# Patient Record
Sex: FEMALE | Race: BLACK OR AFRICAN AMERICAN | NOT HISPANIC OR LATINO | Employment: FULL TIME | RURAL
[De-identification: names, ages, dates, MRNs, and addresses within clinical notes are randomized per-mention and may not be internally consistent; named-entity substitution may affect disease eponyms.]

---

## 2021-04-15 ENCOUNTER — HOSPITAL ENCOUNTER (EMERGENCY)
Facility: HOSPITAL | Age: 36
Discharge: HOME | End: 2021-04-15
Attending: STUDENT IN AN ORGANIZED HEALTH CARE EDUCATION/TRAINING PROGRAM
Payer: COMMERCIAL

## 2021-04-15 VITALS
HEIGHT: 60 IN | DIASTOLIC BLOOD PRESSURE: 95 MMHG | RESPIRATION RATE: 14 BRPM | WEIGHT: 255 LBS | SYSTOLIC BLOOD PRESSURE: 141 MMHG | BODY MASS INDEX: 50.06 KG/M2 | HEART RATE: 74 BPM | TEMPERATURE: 99 F | OXYGEN SATURATION: 100 %

## 2021-04-15 DIAGNOSIS — M94.0 COSTOCHONDRITIS: ICD-10-CM

## 2021-04-15 DIAGNOSIS — I10 HYPERTENSION, UNSPECIFIED TYPE: Primary | ICD-10-CM

## 2021-04-15 PROCEDURE — 99283 EMERGENCY DEPT VISIT LOW MDM: CPT | Mod: ,,, | Performed by: STUDENT IN AN ORGANIZED HEALTH CARE EDUCATION/TRAINING PROGRAM

## 2021-04-15 PROCEDURE — 63600175 PHARM REV CODE 636 W HCPCS: Performed by: STUDENT IN AN ORGANIZED HEALTH CARE EDUCATION/TRAINING PROGRAM

## 2021-04-15 PROCEDURE — 93010 ELECTROCARDIOGRAM REPORT: CPT | Mod: ,,, | Performed by: HOSPITALIST

## 2021-04-15 PROCEDURE — 99283 PR EMERGENCY DEPT VISIT,LEVEL III: ICD-10-PCS | Mod: ,,, | Performed by: STUDENT IN AN ORGANIZED HEALTH CARE EDUCATION/TRAINING PROGRAM

## 2021-04-15 PROCEDURE — 99283 EMERGENCY DEPT VISIT LOW MDM: CPT | Mod: 25

## 2021-04-15 PROCEDURE — 93010 EKG 12-LEAD: ICD-10-PCS | Mod: ,,, | Performed by: HOSPITALIST

## 2021-04-15 PROCEDURE — 93005 ELECTROCARDIOGRAM TRACING: CPT

## 2021-04-15 PROCEDURE — 96372 THER/PROPH/DIAG INJ SC/IM: CPT

## 2021-04-15 RX ORDER — KETOROLAC TROMETHAMINE 30 MG/ML
60 INJECTION, SOLUTION INTRAMUSCULAR; INTRAVENOUS
Status: COMPLETED | OUTPATIENT
Start: 2021-04-15 | End: 2021-04-15

## 2021-04-15 RX ORDER — METHOCARBAMOL 500 MG/1
1000 TABLET, FILM COATED ORAL
Qty: 30 TABLET | Refills: 0 | Status: SHIPPED | OUTPATIENT
Start: 2021-04-15 | End: 2021-04-20

## 2021-04-15 RX ORDER — ORPHENADRINE CITRATE 30 MG/ML
60 INJECTION INTRAMUSCULAR; INTRAVENOUS
Status: COMPLETED | OUTPATIENT
Start: 2021-04-15 | End: 2021-04-15

## 2021-04-15 RX ORDER — NAPROXEN 500 MG/1
500 TABLET ORAL 2 TIMES DAILY PRN
Qty: 14 TABLET | Refills: 0 | Status: SHIPPED | OUTPATIENT
Start: 2021-04-15

## 2021-04-15 RX ADMIN — KETOROLAC TROMETHAMINE 60 MG: 60 INJECTION, SOLUTION INTRAMUSCULAR at 04:04

## 2021-04-15 RX ADMIN — ORPHENADRINE CITRATE 60 MG: 60 INJECTION INTRAMUSCULAR; INTRAVENOUS at 04:04

## 2021-08-11 ENCOUNTER — OFFICE VISIT (OUTPATIENT)
Dept: FAMILY MEDICINE | Facility: CLINIC | Age: 36
End: 2021-08-11
Payer: COMMERCIAL

## 2021-08-11 DIAGNOSIS — Z11.52 ENCOUNTER FOR SCREENING FOR COVID-19: Primary | ICD-10-CM

## 2021-08-11 PROCEDURE — 87635 SARS-COV-2 (COVID-19) QUALITATIVE PCR: ICD-10-PCS | Mod: ,,, | Performed by: CLINICAL MEDICAL LABORATORY

## 2021-08-11 PROCEDURE — 87635 SARS-COV-2 COVID-19 AMP PRB: CPT | Mod: ,,, | Performed by: CLINICAL MEDICAL LABORATORY

## 2021-08-11 PROCEDURE — 99202 OFFICE O/P NEW SF 15 MIN: CPT | Mod: GC,,, | Performed by: FAMILY MEDICINE

## 2021-08-11 PROCEDURE — 99202 PR OFFICE/OUTPT VISIT, NEW, LEVL II, 15-29 MIN: ICD-10-PCS | Mod: GC,,, | Performed by: FAMILY MEDICINE

## 2021-08-12 LAB — SARS-COV-2 RNA RESP QL NAA+PROBE: NEGATIVE

## 2022-02-17 DIAGNOSIS — G56.01 CARPAL TUNNEL SYNDROME ON RIGHT: Primary | ICD-10-CM

## 2022-02-17 DIAGNOSIS — S66.126D LACERATION OF FLEXOR MUSCLE, FASCIA AND TENDON OF RIGHT LITTLE FINGER AT WRIST AND HAND LEVEL, SUBSEQUENT ENCOUNTER: ICD-10-CM

## 2022-02-22 NOTE — PROGRESS NOTES
[facility] Occupational Therapy  Initial Evaluation     Date: 2/23/2022  Name: Grisel Canales  Clinic Number: [id number]    Therapy Diagnosis: No diagnosis found.  Physician: Robin Jaeger MD    Physician Orders: Evaluate and treat.  Medical Diagnosis: Carpal tunnel syndrome on right [G56.01], Laceration of flexor muscle, fascia and tendon of right little finger at wrist and hand level, subsequent encounter [S66.855D]  Surgical Procedure and Date: 2/11/2022, / Date of Injury/Onset: 12/2021  Evaluation Date: 2/23/2022  Insurance Authorization Period Expiration: 2/17/2022 - 2/17/2023  Plan of Care Certification Period: 2/23/2022-4/13/2022      Visit # / Visits authorized: 1 / 13    FOTO: initial eval  Medicare Amount:     Time In:2:53  Time Out: 3:52  Total treatment time: 59minutes      Precautions:  Standard    Subjective     Involved Side: Right upper extremity  Dominant Side: Right  Date of Onset: December 2021  Mechanism of Injury: Pt. 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 Reports went to  Who gave her a shot thinking it was trigger finger, however did not get better. Reports she received MRI which indicated she had ruptured fdp of left 5th digit and frayed ring finger. Underwent surgery 2/11/2022 which consisted of LUE CTR and 5th digit fdp repair.  Surgical Procedure: Carpal tunnel syndrome on right [G56.01], Laceration of flexor muscle, fascia and tendon of right little finger at wrist and hand level  Imaging: MRI studies, CT scan films, bone scan films       Past Medical History/Physical Systems Review:   Grisel Canales  has no past medical history on file.    Grisel Canales  has no past surgical history on file.    Grisel has a current medication list which includes the following prescription(s): humira(cf) pen, albuterol, amlodipine, dicyclomine, famotidine, gabapentin, irbesartan-hydrochlorothiazide, ketorolac,  naproxen, oxybutynin, pantoprazole, potassium chloride, and topiramate.    Review of patient's allergies indicates:  No Known Allergies     Patient's Goals for Therapy: To be able to use Right hand again.    Pain:  Functional Pain Scale Rating 0-10:   4/10 on average  2/10 at best  10/10 at worst  Location: Right upper extremity  Description: Aching, Dull, Tight, Numb and Sharp  Aggravating Factors: Bending, Night Time, Morning, Extension and Flexing  Easing Factors: massage, relaxation, ice, rest and elevation    Occupation:    Working presently: employed      Functional Limitations/Social History:    Previous functional status includes: Independent with all ADLs.     Current FunctionalStatus   Home/Living environment : lives with their family      Limitation of Functional Status as follows:   ADLs/IADLs:     - Feeding: (I)    - Bathing: (I)    - Dressing/Grooming: (I)    - Driving: (I)            Objective     Observation/Appearance:     Edema. Measured in centimeters.   2/22/2022 2/22/2022    Right Left   Wrist Crease 17.4 15.5   MCPs 19.7 18.9     Edema. Measured in centimeters.   2/22/2022 2/22/2022    Right Left   Index:       P1      PIP     P2      DIP     P3     Long:       P1      PIP     P2      DIP     P3     Ring:       P1            6.4 5.8    PIP                P2             5,7 5.1    DIP     P3     Small:        P1           6 4.9     PIP            P2        5 4    DIP     P3     Thumb:     Prox. Phalanx     IP     Distal Phalanx       Elbow and Wrist ROM. Measured in degrees.   2/22/2022 2/22/2022    Right Left   Wrist Ext/Flex Neutral/45 67/65     Hand ROM. Measured in degrees.   2/22/2022 2/22/2022    Right Left        Index: MP  64 71              PIP     59 91              DIP 36 71              HYATT          Long:  MP 59 72              PIP 69 101              DIP 23 86              HYATT          Ring:   MP 37 75              PIP 61 101              DIP 15 84              HYATT           Small:  MP 30 89               PIP 44 88               DIP 20 82              HYATT          Thumb: MP 34 49                IP 60 84      Strength (Dynamometer) and Pinch Strength (Pinch Gauge)  Measured in pounds.   2/22/2022 2/22/2022    Right Left   Rung II NT 32   Key Pinch NT 15   3pt Pinch NT 8   2pt Pinch NT 10       Manual Muscle Test   2/22/2022 2/22/2022    Right Left   Wrist Extension  NT 4/5   Wrist Flexion NT 4/5         Special Tests  Thumb CMC Grind Test    Finkelstein's Test    Phalen's Test    Tinel's Test    Gigi's Test    Chromo-Littler Test    Digital Collateral Stress Test    ORL Test    Froment's Sign    Pinch OK Sign    Ernestina's Sign     Egawa Sign     Clamp Sign     SL Ballottement Test    LT Ballottement Test    Scaphoid/WatsonTest    Linscheid's Test    Metacarpal Stress Test    Piano Key Test    ECU Synergy Test    Ulnar Compression Test    TFCC Load Test    Ulnocarpal Stress Test    Midcarpal Shift Test    Pisiform Boost Test    Elbow Flexion Test    Scratch Collapse Test    Tennis Elbow Test    Resisted Middle Finger Extension Test    Mills Test    Chair Test    Biceps Squeeze Test    Biceps Hook Test    Milking Test    Press Up Manuever    PLRI Test    Valgus Stress Test    Varus Stress Test    Spurling Test    Cervical Distraction Test    ULNTT - General    ULNTT - Median Nerve    ULNTT - Radial Nerve    ULNTT - Ulnar Nerve         CMS Impairment/Limitation/Restriction for FOTO  Survey    Therapist reviewed FOTO scores for Grisel Canales on 2/23/2022.   FOTO documents entered into [employer] - see Media section.    Limitation Score: 25%         Treatment     Treatment Time In: 2:53  Treatment Time Out: 3:52    Grisel received the following supervised modalities after being cleared for contradictions for  minutes:   --N/A    Grisel received the following manual therapy techniques for 8 minutes:   -RUE PROM- Tenodesis- 2x5, Tendon glides- 2x5, 4th/5th digit- MCP flx/ext- 2x5, PIP flx/ext-  2x5, DIP flx/ext-2x5    Grisel received therapeutic exercises for  minutes including:  --N/A    Home Exercise Program/Education:  Issued HEP (see patient instructions in EMR) and educated on modality use for pain management . Exercises were reviewed and Grisel was able to demonstrate them prior to the end of the session.   Pt received a written copy of exercises to perform at home. Grisel demonstrated good  understanding of the education provided.  Pt was advised to perform these exercises free of pain, and to stop performing them if pain occurs.    Patient/Family Education: role of OT, goals for OT, scheduling/cancellations - pt verbalized understanding. Discussed insurance limitations with patient.    Additional Education provided:     Assessment     Grisel Canales is a 36 y.o. female referred to outpatient occupational therapy and presents with a medical diagnosis of Carpal tunnel syndrome on right [G56.01], Laceration of flexor muscle, fascia and tendon of right little finger at wrist and hand level, subsequent encounter [S66.126D]  , resulting in decreased ROM/strength with increased edema/pain and episodes of numbness/tingling and demonstrates limitations as described above. Following medical record review it is determined that pt will benefit from occupational therapy services in order to maximize pain free and/or functional use of right upper extremity. The following goals were discussed with the patient and patient is in agreement with them as to be addressed in the treatment plan. The patient's rehab potential is Good.     Anticipated barriers to occupational therapy:   Pt has no cultural, educational or language barriers to learning provided.        Goals:   The following goals were discussed with the patient and patient is in agreement with them as to be addressed in the treatment plan.   Short term Goals:  1) Initiate HEP  2) Pt will increase AROM of RUE by 5-10 degrees in order to assist with functional  full fist by 4 weeks.  3) Pt will reduce edema by .5-1 cm in affected fingers by 4 weeks.  4) Pt will reduce pain to less than 4/10 by 4 weeks.  5) Pt will increase functional  strength by 5# in order to A in opening containers for med management or home management tasks by 4 weeks.   6) Patient will be able to achieve less than or equal to 25% on the FOTO, demonstrating overall improved functional ability with upper extremity. (Self-care category)    Long Term Goals:  Goals to be met by discharge:  1) Independent with HEP  2) Pt will increase RUE HYATT by 30-40 degrees in order to increase functional fist for grasp with home management or work related tasks by d/c.   3) Pt will decrease edema to trace or none to increase functional ROM by d/c.   4) Pt will decrease pain to trace or none while completing light home management tasks or work related tasks by d/c.   5) Patient will be able to achieve less than or equal to 80% on the FOTO, demonstrating overall improved functional ability with upper extremity.  (Self-care category)        Plan   Certification Period/Plan of care expiration: 2/23/2022 to 4/13/2022.    Outpatient Occupational Therapy 2 times weekly for 7 weeks to include the following interventions: Paraffin, Fluidotherapy, Manual therapy/joint mobilizations, Modalities for pain management, US 3 mhz, Therapeutic exercises/activities., Iontophoresis with 2.0 cc Dexamethasone, Strengthening, Orthotic Fabrication/Fit/Training, Edema Control, Scar Management, Electrical Modalities, Joint Protection and Energy Conservation.      YAA MATTHEWS, OT

## 2022-02-23 ENCOUNTER — CLINICAL SUPPORT (OUTPATIENT)
Dept: REHABILITATION | Facility: HOSPITAL | Age: 37
End: 2022-02-23
Payer: COMMERCIAL

## 2022-02-23 DIAGNOSIS — S66.126D LACERATION OF FLEXOR MUSCLE, FASCIA AND TENDON OF RIGHT LITTLE FINGER AT WRIST AND HAND LEVEL, SUBSEQUENT ENCOUNTER: ICD-10-CM

## 2022-02-23 DIAGNOSIS — G56.01 CARPAL TUNNEL SYNDROME ON RIGHT: ICD-10-CM

## 2022-02-23 PROCEDURE — 97166 OT EVAL MOD COMPLEX 45 MIN: CPT

## 2022-02-23 PROCEDURE — 97140 MANUAL THERAPY 1/> REGIONS: CPT

## 2022-02-23 NOTE — PLAN OF CARE
[facility] Occupational Therapy  Initial Evaluation     Date: 2/23/2022  Name: Grisel Canales  Clinic Number: [id number]    Therapy Diagnosis: No diagnosis found.  Physician: Robin Jaeger MD    Physician Orders: Evaluate and treat.  Medical Diagnosis: Carpal tunnel syndrome on right [G56.01], Laceration of flexor muscle, fascia and tendon of right little finger at wrist and hand level, subsequent encounter [S66.026D]  Surgical Procedure and Date: 2/11/2022, / Date of Injury/Onset: 12/2021  Evaluation Date: 2/23/2022  Insurance Authorization Period Expiration: 2/17/2022 - 2/17/2023  Plan of Care Certification Period: 2/23/2022-4/13/2022      Visit # / Visits authorized: 1 / 13    FOTO: initial eval  Medicare Amount:     Time In:2:53  Time Out: 3:52  Total treatment time: 59minutes      Precautions:  Standard    Subjective     Involved Side: Right upper extremity  Dominant Side: Right  Date of Onset: December 2021  Mechanism of Injury: Pt. 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 Reports went to  Who gave her a shot thinking it was trigger finger, however did not get better. Reports she received MRI which indicated she had ruptured fdp of left 5th digit and frayed ring finger. Underwent surgery 2/11/2022 which consisted of LUE CTR and 5th digit fdp repair.  Surgical Procedure: Carpal tunnel syndrome on right [G56.01], Laceration of flexor muscle, fascia and tendon of right little finger at wrist and hand level  Imaging: MRI studies, CT scan films, bone scan films       Past Medical History/Physical Systems Review:   Grisel Canales  has no past medical history on file.    Grisel Canales  has no past surgical history on file.    Grisel has a current medication list which includes the following prescription(s): humira(cf) pen, albuterol, amlodipine, dicyclomine, famotidine, gabapentin, irbesartan-hydrochlorothiazide, ketorolac,  naproxen, oxybutynin, pantoprazole, potassium chloride, and topiramate.    Review of patient's allergies indicates:  No Known Allergies     Patient's Goals for Therapy: To be able to use Right hand again.    Pain:  Functional Pain Scale Rating 0-10:   4/10 on average  2/10 at best  10/10 at worst  Location: Right upper extremity  Description: Aching, Dull, Tight, Numb and Sharp  Aggravating Factors: Bending, Night Time, Morning, Extension and Flexing  Easing Factors: massage, relaxation, ice, rest and elevation    Occupation:    Working presently: employed      Functional Limitations/Social History:    Previous functional status includes: Independent with all ADLs.     Current FunctionalStatus   Home/Living environment : lives with their family      Limitation of Functional Status as follows:   ADLs/IADLs:     - Feeding: (I)    - Bathing: (I)    - Dressing/Grooming: (I)    - Driving: (I)            Objective     Observation/Appearance:     Edema. Measured in centimeters.   2/22/2022 2/22/2022    Right Left   Wrist Crease 17.4 15.5   MCPs 19.7 18.9     Edema. Measured in centimeters.   2/22/2022 2/22/2022    Right Left   Index:       P1      PIP     P2      DIP     P3     Long:       P1      PIP     P2      DIP     P3     Ring:       P1            6.4 5.8    PIP                P2             5,7 5.1    DIP     P3     Small:        P1           6 4.9     PIP            P2        5 4    DIP     P3     Thumb:     Prox. Phalanx     IP     Distal Phalanx       Elbow and Wrist ROM. Measured in degrees.   2/22/2022 2/22/2022    Right Left   Wrist Ext/Flex Neutral/45 67/65     Hand ROM. Measured in degrees.   2/22/2022 2/22/2022    Right Left        Index: MP  64 71              PIP     59 91              DIP 36 71              HYATT          Long:  MP 59 72              PIP 69 101              DIP 23 86              HYATT          Ring:   MP 37 75              PIP 61 101              DIP 15 84              HYATT           Small:  MP 30 89               PIP 44 88               DIP 20 82              HYATT          Thumb: MP 34 49                IP 60 84      Strength (Dynamometer) and Pinch Strength (Pinch Gauge)  Measured in pounds.   2/22/2022 2/22/2022    Right Left   Rung II NT 32   Key Pinch NT 15   3pt Pinch NT 8   2pt Pinch NT 10       Manual Muscle Test   2/22/2022 2/22/2022    Right Left   Wrist Extension  NT 4/5   Wrist Flexion NT 4/5         Special Tests  Thumb CMC Grind Test    Finkelstein's Test    Phalen's Test    Tinel's Test    Gigi's Test    [place]-Littler Test    Digital Collateral Stress Test    ORL Test    Froment's Sign    Pinch OK Sign    Ernestina's Sign     Egawa Sign     Clamp Sign     SL Ballottement Test    LT Ballottement Test    Scaphoid/WatsonTest    Linscheid's Test    Metacarpal Stress Test    Piano Key Test    ECU Synergy Test    Ulnar Compression Test    TFCC Load Test    Ulnocarpal Stress Test    Midcarpal Shift Test    Pisiform Boost Test    Elbow Flexion Test    Scratch Collapse Test    Tennis Elbow Test    Resisted Middle Finger Extension Test    Mills Test    Chair Test    Biceps Squeeze Test    Biceps Hook Test    Milking Test    Press Up Manuever    PLRI Test    Valgus Stress Test    Varus Stress Test    Spurling Test    Cervical Distraction Test    ULNTT - General    ULNTT - Median Nerve    ULNTT - Radial Nerve    ULNTT - Ulnar Nerve         CMS Impairment/Limitation/Restriction for FOTO  Survey    Therapist reviewed FOTO scores for Grisel Canales on 2/23/2022.   FOTO documents entered into [employer] - see Media section.    Limitation Score: 25%         Treatment     Treatment Time In: 2:53  Treatment Time Out: 3:52    Grisel received the following supervised modalities after being cleared for contradictions for  minutes:   --N/A    Grisel received the following manual therapy techniques for 8 minutes:   -RUE PROM- Tenodesis- 2x5, Tendon glides- 2x5, 4th/5th digit- MCP flx/ext- 2x5, PIP flx/ext-  2x5, DIP flx/ext-2x5    Grisel received therapeutic exercises for  minutes including:  --N/A    Home Exercise Program/Education:  Issued HEP (see patient instructions in EMR) and educated on modality use for pain management . Exercises were reviewed and Grisel was able to demonstrate them prior to the end of the session.   Pt received a written copy of exercises to perform at home. Grisel demonstrated good  understanding of the education provided.  Pt was advised to perform these exercises free of pain, and to stop performing them if pain occurs.    Patient/Family Education: role of OT, goals for OT, scheduling/cancellations - pt verbalized understanding. Discussed insurance limitations with patient.    Additional Education provided:     Assessment     Grisel Canales is a 36 y.o. female referred to outpatient occupational therapy and presents with a medical diagnosis of Carpal tunnel syndrome on right [G56.01], Laceration of flexor muscle, fascia and tendon of right little finger at wrist and hand level, subsequent encounter [S66.126D]  , resulting in decreased ROM/strength with increased edema/pain and episodes of numbness/tingling and demonstrates limitations as described above. Following medical record review it is determined that pt will benefit from occupational therapy services in order to maximize pain free and/or functional use of right upper extremity. The following goals were discussed with the patient and patient is in agreement with them as to be addressed in the treatment plan. The patient's rehab potential is Good.     Anticipated barriers to occupational therapy:   Pt has no cultural, educational or language barriers to learning provided.        Goals:   The following goals were discussed with the patient and patient is in agreement with them as to be addressed in the treatment plan.   Short term Goals:  1) Initiate HEP  2) Pt will increase AROM of RUE by 5-10 degrees in order to assist with functional  full fist by 4 weeks.  3) Pt will reduce edema by .5-1 cm in affected fingers by 4 weeks.  4) Pt will reduce pain to less than 4/10 by 4 weeks.  5) Pt will increase functional  strength by 5# in order to A in opening containers for med management or home management tasks by 4 weeks.   6) Patient will be able to achieve less than or equal to 25% on the FOTO, demonstrating overall improved functional ability with upper extremity. (Self-care category)    Long Term Goals:  Goals to be met by discharge:  1) Independent with HEP  2) Pt will increase RUE HYATT by 30-40 degrees in order to increase functional fist for grasp with home management or work related tasks by d/c.   3) Pt will decrease edema to trace or none to increase functional ROM by d/c.   4) Pt will decrease pain to trace or none while completing light home management tasks or work related tasks by d/c.   5) Patient will be able to achieve less than or equal to 80% on the FOTO, demonstrating overall improved functional ability with upper extremity.  (Self-care category)        Plan   Certification Period/Plan of care expiration: 2/23/2022 to 4/13/2022.    Outpatient Occupational Therapy 2 times weekly for 7 weeks to include the following interventions: Paraffin, Fluidotherapy, Manual therapy/joint mobilizations, Modalities for pain management, US 3 mhz, Therapeutic exercises/activities., Iontophoresis with 2.0 cc Dexamethasone, Strengthening, Orthotic Fabrication/Fit/Training, Edema Control, Scar Management, Electrical Modalities, Joint Protection and Energy Conservation.      YAA MATTHEWS, OT

## 2022-02-28 NOTE — PROGRESS NOTES
[facility]  Occupational Therapy Treatment Note    Date: 3/2/2022  Name: Grisel Canales  Clinic Number: [id number]    Therapy Diagnosis: No diagnosis found.  Physician: Robin Jaeger MD    Physician Orders: Evaluate and treat.  Medical Diagnosis: Carpal tunnel syndrome on right [G56.01], Laceration of flexor muscle, fascia and tendon of right little finger at wrist and hand level, subsequent encounter [S66.138D]  Surgical Procedure and Date: 2/11/2022,   Evaluation Date: 2/23/2022  Insurance Authorization Period Expiration: 2/17/2022 - 2/17/2023  Plan of Care Expiration: 2/23/2022-4/13/2022    Visit # / Visits authorized: 2 / 13      Precautions:  Standard    Time In: 8:56  Time Out: 9:56  Total Billable Time: 60 minutes    SUBJECTIVE     Pt reports: she has more swelling today.  She was compliant with home exercise program given last session.   Response to previous treatment:  Functional change:     Pain: 3/10  Location: right fingers , hands  and wrists      OBJECTIVE     Objective Measures updated at progress report unless specified.    Treatment     Grisel received the treatments listed below:     Supervised modalities after being cleared for contradictions: Paraffin bath - N/A    Direct contact modalities after being cleared for contraindications: Ultrasound - 8min. Volar palm 3.0 MHZ, cont. .70w/cm2    Manual therapy techniques: Joint mobilizations, Myofacial release, Soft tissue Mobilization and Friction Massage were applied to the: Right upper extremity for 32 minutes, including:  -RUE PROM-Wrist flx/ext- 2x10  , joint mobz/distraction- 1x5 , Tendon glides- 5x5 , digits 4/5   MCP flx/ext-  2x10, PIP flx/ext-2x10  , DIP flx/ext-  2x10,soft tissue massage- 25min.    Therapeutic exercises to develop strength, endurance, ROM and flexibility for 20 minutes, including:  -RUE AROM- wrist flx/ext-1x10, Place/hold exercises- 1x15, blocking exercises DIP joint 5th- 2x10, pip- 2x10, mcp  2x10        Patient Education and Home Exercises      Education provided:   -   - Progress towards goals     Written Home Exercises Provided: Patient instructed to cont prior HEP.  Exercises were reviewed and Grisel was able to demonstrate them prior to the end of the session.  Grisel demonstrated good  understanding of the HEP provided. See EMR under Patient Instructions for exercises provided during therapy sessions.       Assessment     Pt would continue to benefit from skilled OT.      Grisel is progressing well towards her goals and there are no updates to goals at this time. Pt prognosis is Good.     Pt will continue to benefit from skilled outpatient occupational therapy to address the deficits listed in the problem list on initial evaluation provide pt/family education and to maximize pt's level of independence in the home and community environment.     Pt's spiritual, cultural and educational needs considered and pt agreeable to plan of care and goals.    Anticipated barriers to occupational therapy:     Goals:  Pt. Will increase AROM of Right Upper extremity as measured by goniometric measurements  Pt. Will increase Right /pinch strength as measured by dynamometer/pinch gauge.  Pt. Will demonstrate ability to utilize RUE to perform functional tasks (I).  Pt. Will be (I) with HEP.    PLAN     Continue OT POC.      YAA MATTHEWS, OT

## 2022-03-02 ENCOUNTER — CLINICAL SUPPORT (OUTPATIENT)
Dept: REHABILITATION | Facility: HOSPITAL | Age: 37
End: 2022-03-02
Payer: COMMERCIAL

## 2022-03-02 DIAGNOSIS — S66.126D LACERATION OF FLEXOR MUSCLE, FASCIA AND TENDON OF RIGHT LITTLE FINGER AT WRIST AND HAND LEVEL, SUBSEQUENT ENCOUNTER: Primary | ICD-10-CM

## 2022-03-02 PROCEDURE — 97140 MANUAL THERAPY 1/> REGIONS: CPT

## 2022-03-02 PROCEDURE — 97035 APP MDLTY 1+ULTRASOUND EA 15: CPT

## 2022-03-02 PROCEDURE — 97110 THERAPEUTIC EXERCISES: CPT

## 2022-03-03 ENCOUNTER — CLINICAL SUPPORT (OUTPATIENT)
Dept: REHABILITATION | Facility: HOSPITAL | Age: 37
End: 2022-03-03
Payer: COMMERCIAL

## 2022-03-03 DIAGNOSIS — S66.126D LACERATION OF FLEXOR MUSCLE, FASCIA AND TENDON OF RIGHT LITTLE FINGER AT WRIST AND HAND LEVEL, SUBSEQUENT ENCOUNTER: Primary | ICD-10-CM

## 2022-03-03 PROCEDURE — 97110 THERAPEUTIC EXERCISES: CPT

## 2022-03-03 PROCEDURE — 97035 APP MDLTY 1+ULTRASOUND EA 15: CPT

## 2022-03-03 PROCEDURE — 97140 MANUAL THERAPY 1/> REGIONS: CPT

## 2022-03-03 NOTE — PROGRESS NOTES
[facility]  Occupational Therapy Treatment Note    Date: 3/3/2022  Name: Grisel Canales  Clinic Number: [id number]    Therapy Diagnosis: No diagnosis found.  Physician: Robin Jaeger MD    Physician Orders: Evaluate and treat.  Medical Diagnosis: Carpal tunnel syndrome on right [G56.01], Laceration of flexor muscle, fascia and tendon of right little finger at wrist and hand level, subsequent encounter [S66.892D]  Surgical Procedure and Date: 2/11/2022,   Evaluation Date: 2/23/2022  Insurance Authorization Period Expiration: 2/17/2022 - 2/17/2023  Plan of Care Expiration: 2/23/2022-4/13/2022    Visit # / Visits authorized: 3 / 13      Precautions:  Standard    Time In: 2:02  Time Out: 2:56  Total Billable Time: 54 minutes    SUBJECTIVE     Pt reports: she has more swelling today.  She was compliant with home exercise program given last session.   Response to previous treatment:  Functional change:     Pain: 3/10  Location: right fingers , hands  and wrists      OBJECTIVE     Objective Measures updated at progress report unless specified.    Treatment     Grisel received the treatments listed below:     Supervised modalities after being cleared for contradictions: Paraffin bath - N/A    Direct contact modalities after being cleared for contraindications: Ultrasound - 8min. Volar palm 3.0 MHZ, cont. .70w/cm2    Manual therapy techniques: Joint mobilizations, Myofacial release, Soft tissue Mobilization and Friction Massage were applied to the: Right upper extremity for 26 minutes, including:  -RUE PROM-Wrist flx/ext- 2x10  , joint mobz/distraction- 1x5 , Tendon glides- 5x5 , digits 4/5   MCP flx/ext-  2x10, PIP flx/ext-2x10  , DIP flx/ext-  2x10,soft tissue massage- 25min.    Therapeutic exercises to develop strength, endurance, ROM and flexibility for 20 minutes, including:  -RUE AROM- wrist flx/ext-1x10, Place/hold exercises- 1x15, blocking exercises DIP joint 5th- 2x10, pip- 2x10, mcp  2x10        Patient Education and Home Exercises      Education provided:   -   - Progress towards goals     Written Home Exercises Provided: Patient instructed to cont prior HEP.  Exercises were reviewed and Grisel was able to demonstrate them prior to the end of the session.  Grisel demonstrated good  understanding of the HEP provided. See EMR under Patient Instructions for exercises provided during therapy sessions.       Assessment     Pt would continue to benefit from skilled OT.      Grisel is progressing well towards her goals and there are no updates to goals at this time. Pt prognosis is Good.     Pt will continue to benefit from skilled outpatient occupational therapy to address the deficits listed in the problem list on initial evaluation provide pt/family education and to maximize pt's level of independence in the home and community environment.     Pt's spiritual, cultural and educational needs considered and pt agreeable to plan of care and goals.    Anticipated barriers to occupational therapy:     Goals:  Pt. Will increase AROM of Right Upper extremity as measured by goniometric measurements  Pt. Will increase Right /pinch strength as measured by dynamometer/pinch gauge.  Pt. Will demonstrate ability to utilize RUE to perform functional tasks (I).  Pt. Will be (I) with HEP.    PLAN     Continue OT POC.      YAA MATTHEWS, OT

## 2022-03-08 ENCOUNTER — CLINICAL SUPPORT (OUTPATIENT)
Dept: REHABILITATION | Facility: HOSPITAL | Age: 37
End: 2022-03-08
Payer: COMMERCIAL

## 2022-03-08 DIAGNOSIS — S66.126D LACERATION OF FLEXOR MUSCLE, FASCIA AND TENDON OF RIGHT LITTLE FINGER AT WRIST AND HAND LEVEL, SUBSEQUENT ENCOUNTER: Primary | ICD-10-CM

## 2022-03-08 PROCEDURE — 97035 APP MDLTY 1+ULTRASOUND EA 15: CPT

## 2022-03-08 PROCEDURE — 97110 THERAPEUTIC EXERCISES: CPT

## 2022-03-08 PROCEDURE — 97140 MANUAL THERAPY 1/> REGIONS: CPT

## 2022-03-08 NOTE — PROGRESS NOTES
[facility]  Occupational Therapy Treatment Note    Date: 3/8/2022  Name: Grisel Canales  Clinic Number: [id number]    Therapy Diagnosis: No diagnosis found.  Physician: Robin Jaeger MD    Physician Orders: Evaluate and treat.  Medical Diagnosis: Carpal tunnel syndrome on right [G56.01], Laceration of flexor muscle, fascia and tendon of right little finger at wrist and hand level, subsequent encounter [S66.055D]  Surgical Procedure and Date: 2/11/2022,   Evaluation Date: 2/23/2022  Insurance Authorization Period Expiration: 2/17/2022 - 2/17/2023  Plan of Care Expiration: 2/23/2022-4/13/2022    Visit # / Visits authorized: 4/ 13      Precautions:  Standard    Time In: 8:13  Time Out: 9:10  Total Billable Time: 57 minutes    SUBJECTIVE     Pt reports: she is sore in right 5th dip joint this date.  She was compliant with home exercise program given last session.   Response to previous treatment:  Functional change:     Pain: 3/10  Location: right fingers , hands  and wrists      OBJECTIVE     Objective Measures updated at progress report unless specified.    Treatment     Grisel received the treatments listed below:     Supervised modalities after being cleared for contradictions: Paraffin bath - N/A    Direct contact modalities after being cleared for contraindications: Ultrasound - 8min. Volar palm 3.0 MHZ, cont. .70w/cm2    Manual therapy techniques: Joint mobilizations, Myofacial release, Soft tissue Mobilization and Friction Massage were applied to the: Right upper extremity for 29 minutes, including:  -RUE PROM-Wrist flx/ext- 2x10  , joint mobz/distraction- 1x5 , Tendon glides- 5x5 , digits 4/5   MCP flx/ext-  2x10, PIP flx/ext-2x10  , DIP flx/ext-  2x10,soft tissue massage- 25min.    Therapeutic exercises to develop strength, endurance, ROM and flexibility for 20 minutes, including:  -RUE AROM- wrist flx/ext-1x10, Place/hold exercises- 1x15, blocking exercises DIP joint 5th- 2x10, pip-  2x10, mcp 2x10        Patient Education and Home Exercises      Education provided:   -   - Progress towards goals     Written Home Exercises Provided: Patient instructed to cont prior HEP.  Exercises were reviewed and Grisel was able to demonstrate them prior to the end of the session.  Grisel demonstrated good  understanding of the HEP provided. See EMR under Patient Instructions for exercises provided during therapy sessions.       Assessment     Pt would continue to benefit from skilled OT.      Grisel is progressing well towards her goals and there are no updates to goals at this time. Pt prognosis is Good.     Pt will continue to benefit from skilled outpatient occupational therapy to address the deficits listed in the problem list on initial evaluation provide pt/family education and to maximize pt's level of independence in the home and community environment.     Pt's spiritual, cultural and educational needs considered and pt agreeable to plan of care and goals.    Anticipated barriers to occupational therapy:     Goals:  Pt. Will increase AROM of Right Upper extremity as measured by goniometric measurements  Pt. Will increase Right /pinch strength as measured by dynamometer/pinch gauge.  Pt. Will demonstrate ability to utilize RUE to perform functional tasks (I).  Pt. Will be (I) with HEP.    PLAN     Continue OT POC.      YAA MATTHEWS, OT

## 2022-03-10 ENCOUNTER — CLINICAL SUPPORT (OUTPATIENT)
Dept: REHABILITATION | Facility: HOSPITAL | Age: 37
End: 2022-03-10
Payer: COMMERCIAL

## 2022-03-10 DIAGNOSIS — S66.126D LACERATION OF FLEXOR MUSCLE, FASCIA AND TENDON OF RIGHT LITTLE FINGER AT WRIST AND HAND LEVEL, SUBSEQUENT ENCOUNTER: Primary | ICD-10-CM

## 2022-03-10 PROCEDURE — 97140 MANUAL THERAPY 1/> REGIONS: CPT

## 2022-03-10 PROCEDURE — 97110 THERAPEUTIC EXERCISES: CPT

## 2022-03-10 PROCEDURE — 97018 PARAFFIN BATH THERAPY: CPT | Mod: 59

## 2022-03-10 NOTE — PROGRESS NOTES
[facility]  Occupational Therapy Treatment Note    Date: 3/10/2022  Name: Grisel Canales  Clinic Number: [id number]    Therapy Diagnosis: No diagnosis found.  Physician: Robin Jaeger MD    Physician Orders: Evaluate and treat.  Medical Diagnosis: Carpal tunnel syndrome on right [G56.01], Laceration of flexor muscle, fascia and tendon of right little finger at wrist and hand level, subsequent encounter [S66.517D]  Surgical Procedure and Date: 2/11/2022,   Evaluation Date: 2/23/2022  Insurance Authorization Period Expiration: 2/17/2022 - 2/17/2023  Plan of Care Expiration: 2/23/2022-4/13/2022    Visit # / Visits authorized: 5/ 13      Precautions:  Standard    Time In: 3:02  Time Out: 3:59  Total Billable Time: 57 minutes    SUBJECTIVE     Pt reports: she is unable to to tell if finger is moving due to decreased sensation.  She was compliant with home exercise program given last session.   Response to previous treatment:  Functional change:     Pain: 3/10  Location: right fingers , hands  and wrists      OBJECTIVE     Objective Measures updated at progress report unless specified.    Treatment     Grisel received the treatments listed below:     Supervised modalities after being cleared for contradictions: Paraffin bath - 8min.    Direct contact modalities after being cleared for contraindications: Ultrasound -N/A    Manual therapy techniques: Joint mobilizations, Myofacial release, Soft tissue Mobilization and Friction Massage were applied to the: Right upper extremity for 29 minutes, including:  -RUE PROM-Wrist flx/ext- 2x10  , joint mobz/distraction- 1x5 , Tendon glides- 5x5 , digits 4/5   MCP flx/ext-  2x10, PIP flx/ext-2x10  , DIP flx/ext-  2x10,soft tissue massage- 25min.    Therapeutic exercises to develop strength, endurance, ROM and flexibility for 20 minutes, including:  -RUE AROM- wrist flx/ext-1x10, Place/hold exercises- 1x15, blocking exercises DIP joint 5th- 2x10, pip- 2x10, mcp  2x10        Patient Education and Home Exercises      Education provided:   -   - Progress towards goals     Written Home Exercises Provided: Patient instructed to cont prior HEP.  Exercises were reviewed and Grisel was able to demonstrate them prior to the end of the session.  Grisel demonstrated good  understanding of the HEP provided. See EMR under Patient Instructions for exercises provided during therapy sessions.       Assessment     Pt would continue to benefit from skilled OT.      Grisel is progressing well towards her goals and there are no updates to goals at this time. Pt prognosis is Good.     Pt will continue to benefit from skilled outpatient occupational therapy to address the deficits listed in the problem list on initial evaluation provide pt/family education and to maximize pt's level of independence in the home and community environment.     Pt's spiritual, cultural and educational needs considered and pt agreeable to plan of care and goals.    Anticipated barriers to occupational therapy:     Goals:  Pt. Will increase AROM of Right Upper extremity as measured by goniometric measurements  Pt. Will increase Right /pinch strength as measured by dynamometer/pinch gauge.  Pt. Will demonstrate ability to utilize RUE to perform functional tasks (I).  Pt. Will be (I) with HEP.    PLAN     Continue OT POC.      YAA MATTHEWS, OT

## 2022-03-14 ENCOUNTER — CLINICAL SUPPORT (OUTPATIENT)
Dept: REHABILITATION | Facility: HOSPITAL | Age: 37
End: 2022-03-14
Payer: COMMERCIAL

## 2022-03-14 DIAGNOSIS — S66.126D LACERATION OF FLEXOR MUSCLE, FASCIA AND TENDON OF RIGHT LITTLE FINGER AT WRIST AND HAND LEVEL, SUBSEQUENT ENCOUNTER: Primary | ICD-10-CM

## 2022-03-14 PROCEDURE — 97110 THERAPEUTIC EXERCISES: CPT

## 2022-03-14 PROCEDURE — 97140 MANUAL THERAPY 1/> REGIONS: CPT

## 2022-03-14 PROCEDURE — 97018 PARAFFIN BATH THERAPY: CPT | Mod: 59

## 2022-03-14 NOTE — PROGRESS NOTES
[facility]  Occupational Therapy Treatment Note    Date: 3/14/2022  Name: Grisel Canales  Clinic Number: [id number]    Therapy Diagnosis: No diagnosis found.  Physician: Robin Jaeger MD    Physician Orders: Evaluate and treat.  Medical Diagnosis: Carpal tunnel syndrome on right [G56.01], Laceration of flexor muscle, fascia and tendon of right little finger at wrist and hand level, subsequent encounter [S66.922D]  Surgical Procedure and Date: 2/11/2022,   Evaluation Date: 2/23/2022  Insurance Authorization Period Expiration: 2/17/2022 - 2/17/2023  Plan of Care Expiration: 2/23/2022-4/13/2022    Visit # / Visits authorized: 6/ 13      Precautions:  Standard    Time In: 9:13  Time Out: 10:13  Total Billable Time: 60 minutes    SUBJECTIVE     Pt reports: she is still having trouble bending finger at pip joint.  She was compliant with home exercise program given last session.   Response to previous treatment:  Functional change:     Pain: 2/10  Location: right fingers , hands  and wrists      OBJECTIVE     Objective Measures updated at progress report unless specified.    Treatment     Grisel received the treatments listed below:     Supervised modalities after being cleared for contradictions: Paraffin bath - 8min.    Direct contact modalities after being cleared for contraindications: Ultrasound -N/A    Manual therapy techniques: Joint mobilizations, Myofacial release, Soft tissue Mobilization and Friction Massage were applied to the: Right upper extremity for 30 minutes, including:  -RUE PROM-Wrist flx/ext- 2x10  , joint mobz/distraction- 1x5 , Tendon glides- 5x5 , digits 4/5   MCP flx/ext-  2x10, PIP flx/ext-2x10  , DIP flx/ext-  2x10,soft tissue massage- 25min.    Therapeutic exercises to develop strength, endurance, ROM and flexibility for 22 minutes, including:  -RUE AROM- wrist flx/ext-1x10, Place/hold exercises- 1x15, blocking exercises DIP joint 5th- 2x10, pip- 2x10, mcp  2x10        Patient Education and Home Exercises      Education provided:   -   - Progress towards goals     Written Home Exercises Provided: Patient instructed to cont prior HEP.  Exercises were reviewed and Grisel was able to demonstrate them prior to the end of the session.  Grisel demonstrated good  understanding of the HEP provided. See EMR under Patient Instructions for exercises provided during therapy sessions.       Assessment     Pt would continue to benefit from skilled OT.      Grisel is progressing well towards her goals and there are no updates to goals at this time. Pt prognosis is Good.     Pt will continue to benefit from skilled outpatient occupational therapy to address the deficits listed in the problem list on initial evaluation provide pt/family education and to maximize pt's level of independence in the home and community environment.     Pt's spiritual, cultural and educational needs considered and pt agreeable to plan of care and goals.    Anticipated barriers to occupational therapy:     Goals:  Pt. Will increase AROM of Right Upper extremity as measured by goniometric measurements  Pt. Will increase Right /pinch strength as measured by dynamometer/pinch gauge.  Pt. Will demonstrate ability to utilize RUE to perform functional tasks (I).  Pt. Will be (I) with HEP.    PLAN     Continue OT POC.      YAA MATTHEWS, OT

## 2022-03-21 ENCOUNTER — CLINICAL SUPPORT (OUTPATIENT)
Dept: REHABILITATION | Facility: HOSPITAL | Age: 37
End: 2022-03-21
Payer: COMMERCIAL

## 2022-03-21 DIAGNOSIS — S66.126D LACERATION OF FLEXOR MUSCLE, FASCIA AND TENDON OF RIGHT LITTLE FINGER AT WRIST AND HAND LEVEL, SUBSEQUENT ENCOUNTER: Primary | ICD-10-CM

## 2022-03-21 PROCEDURE — 97140 MANUAL THERAPY 1/> REGIONS: CPT

## 2022-03-21 PROCEDURE — 97110 THERAPEUTIC EXERCISES: CPT

## 2022-03-21 PROCEDURE — 97018 PARAFFIN BATH THERAPY: CPT

## 2022-03-21 NOTE — PROGRESS NOTES
[facility]  Occupational Therapy Treatment Note    Date: 3/21/2022  Name: Grisel Canales  Clinic Number: [id number]    Therapy Diagnosis: No diagnosis found.  Physician: Robin Jaeger MD    Physician Orders: Evaluate and treat.  Medical Diagnosis: Carpal tunnel syndrome on right [G56.01], Laceration of flexor muscle, fascia and tendon of right little finger at wrist and hand level, subsequent encounter [S66.195D]  Surgical Procedure and Date: 2/11/2022,   Evaluation Date: 2/23/2022  Insurance Authorization Period Expiration: 2/17/2022 - 2/17/2023  Plan of Care Expiration: 2/23/2022-4/13/2022    Visit # / Visits authorized: 7/ 13      Precautions:  Standard    Time In: 9:03  Time Out: 10:20  Total Billable Time: 77 minutes    SUBJECTIVE     Pt reports: she is bending pip and dip joint better.  She was compliant with home exercise program given last session.   Response to previous treatment:  Functional change:     Pain: 2/10  Location: right fingers , hands  and wrists      OBJECTIVE     Objective Measures updated at progress report unless specified.    Treatment     Grisel received the treatments listed below:     Supervised modalities after being cleared for contradictions: Paraffin bath - 8min.    Direct contact modalities after being cleared for contraindications: Ultrasound -N/A    Manual therapy techniques: Joint mobilizations, Myofacial release, Soft tissue Mobilization and Friction Massage were applied to the: Right upper extremity for 37 minutes, including:  -RUE PROM-Wrist flx/ext- 2x10  , joint mobz/distraction- 1x5 , Tendon glides- 5x5 , digits 4/5   MCP flx/ext-  2x10, PIP flx/ext-2x10  , DIP flx/ext-  2x10,soft tissue massage- 25min.    Therapeutic exercises to develop strength, endurance, ROM and flexibility for 32 minutes, including:  -RUE AROM- wrist flx/ext-1x10, Place/hold exercises- 1x15, blocking exercises DIP joint 5th- 2x10, pip- 2x10, mcp 2x10        Patient Education  and Home Exercises      Education provided:   -   - Progress towards goals     Written Home Exercises Provided: Patient instructed to cont prior HEP.  Exercises were reviewed and Grisel was able to demonstrate them prior to the end of the session.  Grisel demonstrated good  understanding of the HEP provided. See EMR under Patient Instructions for exercises provided during therapy sessions.       Assessment     Pt would continue to benefit from skilled OT.      Grisel is progressing well towards her goals and there are no updates to goals at this time. Pt prognosis is Good.     Pt will continue to benefit from skilled outpatient occupational therapy to address the deficits listed in the problem list on initial evaluation provide pt/family education and to maximize pt's level of independence in the home and community environment.     Pt's spiritual, cultural and educational needs considered and pt agreeable to plan of care and goals.    Anticipated barriers to occupational therapy:     Goals:  Pt. Will increase AROM of Right Upper extremity as measured by goniometric measurements  Pt. Will increase Right /pinch strength as measured by dynamometer/pinch gauge.  Pt. Will demonstrate ability to utilize RUE to perform functional tasks (I).  Pt. Will be (I) with HEP.    PLAN     Continue OT POC.      YAA MATTHEWS, OT

## 2022-03-23 ENCOUNTER — CLINICAL SUPPORT (OUTPATIENT)
Dept: REHABILITATION | Facility: HOSPITAL | Age: 37
End: 2022-03-23
Payer: COMMERCIAL

## 2022-03-23 DIAGNOSIS — S66.126D LACERATION OF FLEXOR MUSCLE, FASCIA AND TENDON OF RIGHT LITTLE FINGER AT WRIST AND HAND LEVEL, SUBSEQUENT ENCOUNTER: Primary | ICD-10-CM

## 2022-03-23 PROCEDURE — 97110 THERAPEUTIC EXERCISES: CPT

## 2022-03-23 PROCEDURE — 97018 PARAFFIN BATH THERAPY: CPT

## 2022-03-23 PROCEDURE — 97140 MANUAL THERAPY 1/> REGIONS: CPT

## 2022-03-23 NOTE — PROGRESS NOTES
[facility]  Occupational Therapy Treatment Note    Date: 3/23/2022  Name: Grisel Canales  Clinic Number: [id number]    Therapy Diagnosis: No diagnosis found.  Physician: Robin Jaeger MD    Physician Orders: Evaluate and treat.  Medical Diagnosis: Carpal tunnel syndrome on right [G56.01], Laceration of flexor muscle, fascia and tendon of right little finger at wrist and hand level, subsequent encounter [S66.829D]  Surgical Procedure and Date: 2/11/2022,   Evaluation Date: 2/23/2022  Insurance Authorization Period Expiration: 2/17/2022 - 2/17/2023  Plan of Care Expiration: 2/23/2022-4/13/2022    Visit # / Visits authorized: 8/ 13      Precautions:  Standard    Time In: 9:00  Time Out: 10:00  Total Billable Time: 60 minutes    SUBJECTIVE     Pt reports: she feels motion is slowly improving.  She was compliant with home exercise program given last session.   Response to previous treatment:  Functional change:     Pain: 3/10  Location: right fingers , hands  and wrists      OBJECTIVE     Objective Measures updated at progress report unless specified.    Treatment     Grisel received the treatments listed below:     Supervised modalities after being cleared for contradictions: Paraffin bath - 8min.    Direct contact modalities after being cleared for contraindications: Ultrasound -N/A    Manual therapy techniques: Joint mobilizations, Myofacial release, Soft tissue Mobilization and Friction Massage were applied to the: Right upper extremity for 30 minutes, including:  -RUE PROM-Wrist flx/ext- 2x10  , joint mobz/distraction- 1x5 , Tendon glides- 5x5 , digits 4/5   MCP flx/ext-  2x10, PIP flx/ext-2x10  , DIP flx/ext-  2x10,soft tissue massage- 25min.    Therapeutic exercises to develop strength, endurance, ROM and flexibility for 22 minutes, including:  -RUE AROM- wrist flx/ext-1x10, Place/hold exercises- 1x15, blocking exercises DIP joint 5th- 2x10, pip- 2x10, mcp 2x10        Patient Education and  Home Exercises      Education provided:   -   - Progress towards goals     Written Home Exercises Provided: Patient instructed to cont prior HEP.  Exercises were reviewed and Grisel was able to demonstrate them prior to the end of the session.  Grisel demonstrated good  understanding of the HEP provided. See EMR under Patient Instructions for exercises provided during therapy sessions.       Assessment     Pt would continue to benefit from skilled OT.      Grisel is progressing well towards her goals and there are no updates to goals at this time. Pt prognosis is Good.     Pt will continue to benefit from skilled outpatient occupational therapy to address the deficits listed in the problem list on initial evaluation provide pt/family education and to maximize pt's level of independence in the home and community environment.     Pt's spiritual, cultural and educational needs considered and pt agreeable to plan of care and goals.    Anticipated barriers to occupational therapy:     Goals:  Pt. Will increase AROM of Right Upper extremity as measured by goniometric measurements  Pt. Will increase Right /pinch strength as measured by dynamometer/pinch gauge.  Pt. Will demonstrate ability to utilize RUE to perform functional tasks (I).  Pt. Will be (I) with HEP.    PLAN     Continue OT POC.      YAA MATTHEWS, OT

## 2022-03-30 ENCOUNTER — CLINICAL SUPPORT (OUTPATIENT)
Dept: REHABILITATION | Facility: HOSPITAL | Age: 37
End: 2022-03-30
Payer: COMMERCIAL

## 2022-03-30 DIAGNOSIS — S66.126D LACERATION OF FLEXOR MUSCLE, FASCIA AND TENDON OF RIGHT LITTLE FINGER AT WRIST AND HAND LEVEL, SUBSEQUENT ENCOUNTER: Primary | ICD-10-CM

## 2022-03-30 PROCEDURE — 97018 PARAFFIN BATH THERAPY: CPT

## 2022-03-30 PROCEDURE — 97110 THERAPEUTIC EXERCISES: CPT

## 2022-03-30 PROCEDURE — 97140 MANUAL THERAPY 1/> REGIONS: CPT

## 2022-03-30 NOTE — PROGRESS NOTES
[facility]  Occupational Therapy Treatment Note    Date: 3/30/2022  Name: Grisel Canales  Clinic Number: [id number]    Therapy Diagnosis: No diagnosis found.  Physician: Robin Jaeger MD    Physician Orders: Evaluate and treat.  Medical Diagnosis: Carpal tunnel syndrome on right [G56.01], Laceration of flexor muscle, fascia and tendon of right little finger at wrist and hand level, subsequent encounter [S66.606D]  Surgical Procedure and Date: 2/11/2022,   Evaluation Date: 2/23/2022  Insurance Authorization Period Expiration: 2/17/2022 - 2/17/2023  Plan of Care Expiration: 2/23/2022-4/13/2022    Visit # / Visits authorized: 9/ 13      Precautions:  Standard    Time In: 8:57  Time Out: 10:20  Total Billable Time: 83 minutes    SUBJECTIVE     Pt reports: incision site is itching.  She was compliant with home exercise program given last session.   Response to previous treatment:  Functional change:     Pain: 0/10  Location: right fingers , hands  and wrists      OBJECTIVE     Objective Measures updated at progress report unless specified.    Treatment     Grisel received the treatments listed below:     Supervised modalities after being cleared for contradictions: Paraffin bath - 8min.    Direct contact modalities after being cleared for contraindications: Ultrasound -N/A    Manual therapy techniques: Joint mobilizations, Myofacial release, Soft tissue Mobilization and Friction Massage were applied to the: Right upper extremity for 38 minutes, including:  -RUE PROM-Wrist flx/ext- 2x10  , joint mobz/distraction- 1x5 , Tendon glides- 5x5 , digits 4/5   MCP flx/ext-  2x10, PIP flx/ext-2x10  , DIP flx/ext-  2x10,soft tissue massage- 25min.    Therapeutic exercises to develop strength, endurance, ROM and flexibility for 37 minutes, including:  -RUE AROM- wrist flx/ext-1x10, Place/hold exercises- 1x15, blocking exercises DIP joint 5th- 2x10, pip- 2x10, mcp 2x10        Patient Education and Home  Exercises      Education provided:   -   - Progress towards goals     Written Home Exercises Provided: Patient instructed to cont prior HEP.  Exercises were reviewed and Grisel was able to demonstrate them prior to the end of the session.  Grisel demonstrated good  understanding of the HEP provided. See EMR under Patient Instructions for exercises provided during therapy sessions.       Assessment     Pt would continue to benefit from skilled OT.      Grisel is progressing well towards her goals and there are no updates to goals at this time. Pt prognosis is Good.     Pt will continue to benefit from skilled outpatient occupational therapy to address the deficits listed in the problem list on initial evaluation provide pt/family education and to maximize pt's level of independence in the home and community environment.     Pt's spiritual, cultural and educational needs considered and pt agreeable to plan of care and goals.    Anticipated barriers to occupational therapy:     Goals:  Pt. Will increase AROM of Right Upper extremity as measured by goniometric measurements  Pt. Will increase Right /pinch strength as measured by dynamometer/pinch gauge.  Pt. Will demonstrate ability to utilize RUE to perform functional tasks (I).  Pt. Will be (I) with HEP.    PLAN     Continue OT POC.      YAA MATTHEWS, OT

## 2022-04-04 ENCOUNTER — CLINICAL SUPPORT (OUTPATIENT)
Dept: REHABILITATION | Facility: HOSPITAL | Age: 37
End: 2022-04-04
Payer: COMMERCIAL

## 2022-04-04 DIAGNOSIS — S66.126D LACERATION OF FLEXOR MUSCLE, FASCIA AND TENDON OF RIGHT LITTLE FINGER AT WRIST AND HAND LEVEL, SUBSEQUENT ENCOUNTER: Primary | ICD-10-CM

## 2022-04-04 PROCEDURE — 97018 PARAFFIN BATH THERAPY: CPT | Mod: 25

## 2022-04-04 PROCEDURE — 97140 MANUAL THERAPY 1/> REGIONS: CPT

## 2022-04-04 PROCEDURE — 97110 THERAPEUTIC EXERCISES: CPT

## 2022-04-04 NOTE — PLAN OF CARE
[facility]  Occupational Therapy Progress Note    Date: 4/4/2022  Name: Grisel Canales  Clinic Number: [id number]    Therapy Diagnosis: No diagnosis found.  Physician: Robin Jaeger MD    Physician Orders: Evaluate and treat.  Medical Diagnosis: Carpal tunnel syndrome on right [G56.01], Laceration of flexor muscle, fascia and tendon of right little finger at wrist and hand level, subsequent encounter [S66.084D]  Surgical Procedure and Date: 2/11/2022,   Evaluation Date: 2/23/2022  Insurance Authorization Period Expiration: 2/17/2022 - 2/17/2023  Plan of Care Expiration: 2/23/2022-4/13/2022    Visit # / Visits authorized: 10/ 13      Precautions:  Standard    Time In: 9:00  Time Out: 10:20  Total Billable Time:  80minutes    SUBJECTIVE     Pt reports: she has not been able to perform exercises like she should because of work.  She was compliant with home exercise program given last session.   Response to previous treatment:  Functional change:     Pain: 3/10  Location: right fingers , hands  and wrists      OBJECTIVE     Objective Measures updated at progress report unless specified.    Treatment     Grisel received the treatments listed below:     Supervised modalities after being cleared for contradictions: Paraffin bath - 8min.    Direct contact modalities after being cleared for contraindications: Ultrasound -N/A    Manual therapy techniques: Joint mobilizations, Myofacial release, Soft tissue Mobilization and Friction Massage were applied to the: Right upper extremity for 35 minutes, including:  -RUE PROM-Wrist flx/ext- 2x10  , joint mobz/distraction- 1x5 , Tendon glides- 5x5 , digits 4/5   MCP flx/ext-  2x10, PIP flx/ext-2x10  , DIP flx/ext-  2x10,soft tissue massage- 25min.    Therapeutic exercises to develop strength, endurance, ROM and flexibility for 37 minutes, including:  -RUE AROM- wrist flx/ext-1x10, Place/hold exercises- 1x15, blocking exercises DIP joint 5th- 2x10, pip- 2x10,  mcp 2x10        Patient Education and Home Exercises      Education provided:   -   - Progress towards goals     Written Home Exercises Provided: Patient instructed to cont prior HEP.  Exercises were reviewed and Grisel was able to demonstrate them prior to the end of the session.  Grisel demonstrated good  understanding of the HEP provided. See EMR under Patient Instructions for exercises provided during therapy sessions.       Assessment     Pt would continue to benefit from skilled OT.      Grisel is progressing well towards her goals and there are no updates to goals at this time. Pt prognosis is Good.     Pt will continue to benefit from skilled outpatient occupational therapy to address the deficits listed in the problem list on initial evaluation provide pt/family education and to maximize pt's level of independence in the home and community environment.     Pt's spiritual, cultural and educational needs considered and pt agreeable to plan of care and goals.    Anticipated barriers to occupational therapy:     Goals:  Pt. Will increase AROM of Right Upper extremity as measured by goniometric measurements.( RUE AROM- wrist flx-61  , wrist ext- 65 , digit 1 MP- 56 , IP- 81 , 2 MCP- 80 , PIP- 94 , DIP-62  , 3MCP-83  , PIP-95  , DIP- 82 ,4MCP-84  , PIP- 100 , DIP- 61, 5MCP- 80 , PIP-51  , DIP-51  )  Pt. Will increase Right /pinch strength as measured by dynamometer/pinch gauge.( Right - 16 , Pinch-  Lateral- 13 , tip-  8, tripod- 7 )  Pt. Will demonstrate ability to utilize RUE to perform functional tasks (I).( Pt. Demonstrates ability to utilize RUE to perform functional tasks (I), however is limited due to decreased sensation and adhesions limiting full fist flexion ).  Pt. Will be (I) with HEP.( Pt. Verbalizes and demonstrates (I) with HEP).    PLAN   Feel pt. Is not being compliant with exercises at home. Continues to have difficulty with flexing pip joint of 5th digit on RUE. Have worked on scar  tissue since day one of evaluation. Pt. Reports she tried to work on scar tissue, however not sure if she is working on it as often as should. Continues to reports numbness/tingling in 5th digit which could somewhat limit her motion. Believe pt. Has adhesions which is limiting motion with full fist at pip joint.    Outpatient Occupational Therapy 2 times weekly for 7 weeks to include the following interventions: Paraffin, Fluidotherapy, Manual therapy/joint mobilizations, Modalities for pain management, US 3 mhz, Therapeutic exercises/activities., Iontophoresis with 2.0 cc Dexamethasone, Strengthening, Orthotic Fabrication/Fit/Training, Edema Control, Scar Management, Electrical Modalities, Joint Protection and Energy Conservation.      YAA MATTHEWS, OT

## 2022-04-04 NOTE — PROGRESS NOTES
[facility]  Occupational Therapy Progress Note    Date: 4/4/2022  Name: Grisel Canales  Clinic Number: [id number]    Therapy Diagnosis: No diagnosis found.  Physician: Robin Jaeger MD    Physician Orders: Evaluate and treat.  Medical Diagnosis: Carpal tunnel syndrome on right [G56.01], Laceration of flexor muscle, fascia and tendon of right little finger at wrist and hand level, subsequent encounter [S66.388D]  Surgical Procedure and Date: 2/11/2022,   Evaluation Date: 2/23/2022  Insurance Authorization Period Expiration: 2/17/2022 - 2/17/2023  Plan of Care Expiration: 2/23/2022-4/13/2022    Visit # / Visits authorized: 10/ 13      Precautions:  Standard    Time In: 9:00  Time Out: 10:20  Total Billable Time:  80minutes    SUBJECTIVE     Pt reports: she has not been able to perform exercises like she should because of work.  She was compliant with home exercise program given last session.   Response to previous treatment:  Functional change:     Pain: 3/10  Location: right fingers , hands  and wrists      OBJECTIVE     Objective Measures updated at progress report unless specified.    Treatment     Grisel received the treatments listed below:     Supervised modalities after being cleared for contradictions: Paraffin bath - 8min.    Direct contact modalities after being cleared for contraindications: Ultrasound -N/A    Manual therapy techniques: Joint mobilizations, Myofacial release, Soft tissue Mobilization and Friction Massage were applied to the: Right upper extremity for 35 minutes, including:  -RUE PROM-Wrist flx/ext- 2x10  , joint mobz/distraction- 1x5 , Tendon glides- 5x5 , digits 4/5   MCP flx/ext-  2x10, PIP flx/ext-2x10  , DIP flx/ext-  2x10,soft tissue massage- 25min.    Therapeutic exercises to develop strength, endurance, ROM and flexibility for 37 minutes, including:  -RUE AROM- wrist flx/ext-1x10, Place/hold exercises- 1x15, blocking exercises DIP joint 5th- 2x10, pip- 2x10,  mcp 2x10        Patient Education and Home Exercises      Education provided:   -   - Progress towards goals     Written Home Exercises Provided: Patient instructed to cont prior HEP.  Exercises were reviewed and Grisel was able to demonstrate them prior to the end of the session.  Grisel demonstrated good  understanding of the HEP provided. See EMR under Patient Instructions for exercises provided during therapy sessions.       Assessment     Pt would continue to benefit from skilled OT.      Grisel is progressing well towards her goals and there are no updates to goals at this time. Pt prognosis is Good.     Pt will continue to benefit from skilled outpatient occupational therapy to address the deficits listed in the problem list on initial evaluation provide pt/family education and to maximize pt's level of independence in the home and community environment.     Pt's spiritual, cultural and educational needs considered and pt agreeable to plan of care and goals.    Anticipated barriers to occupational therapy:     Goals:  Pt. Will increase AROM of Right Upper extremity as measured by goniometric measurements.( RUE AROM- wrist flx-61  , wrist ext- 65 , digit 1 MP- 56 , IP- 81 , 2 MCP- 80 , PIP- 94 , DIP-62  , 3MCP-83  , PIP-95  , DIP- 82 ,4MCP-84  , PIP- 100 , DIP- 61, 5MCP- 80 , PIP-51  , DIP-51  )  Pt. Will increase Right /pinch strength as measured by dynamometer/pinch gauge.( Right - 16 , Pinch-  Lateral- 13 , tip-  8, tripod- 7 )  Pt. Will demonstrate ability to utilize RUE to perform functional tasks (I).( Pt. Demonstrates ability to utilize RUE to perform functional tasks (I), however is limited due to decreased sensation and adhesions limiting full fist flexion ).  Pt. Will be (I) with HEP.( Pt. Verbalizes and demonstrates (I) with HEP).    PLAN   Feel pt. Is not being compliant with exercises at home. Continues to have difficulty with flexing pip joint of 5th digit on RUE. Have worked on scar  tissue since day one of evaluation. Pt. Reports she tried to work on scar tissue, however not sure if she is working on it as often as should. Continues to reports numbness/tingling in 5th digit which could somewhat limit her motion. Believe pt. Has adhesions which is limiting motion with full fist at pip joint.    Outpatient Occupational Therapy 2 times weekly for 7 weeks to include the following interventions: Paraffin, Fluidotherapy, Manual therapy/joint mobilizations, Modalities for pain management, US 3 mhz, Therapeutic exercises/activities., Iontophoresis with 2.0 cc Dexamethasone, Strengthening, Orthotic Fabrication/Fit/Training, Edema Control, Scar Management, Electrical Modalities, Joint Protection and Energy Conservation.      YAA MATTHEWS, OT

## 2022-04-11 ENCOUNTER — CLINICAL SUPPORT (OUTPATIENT)
Dept: REHABILITATION | Facility: HOSPITAL | Age: 37
End: 2022-04-11
Payer: COMMERCIAL

## 2022-04-11 DIAGNOSIS — S66.126D LACERATION OF FLEXOR MUSCLE, FASCIA AND TENDON OF RIGHT LITTLE FINGER AT WRIST AND HAND LEVEL, SUBSEQUENT ENCOUNTER: Primary | ICD-10-CM

## 2022-04-11 PROCEDURE — 97018 PARAFFIN BATH THERAPY: CPT | Mod: 25

## 2022-04-11 PROCEDURE — 97110 THERAPEUTIC EXERCISES: CPT

## 2022-04-11 PROCEDURE — 97140 MANUAL THERAPY 1/> REGIONS: CPT

## 2022-04-11 NOTE — PROGRESS NOTES
[facility]  Occupational Therapy Treatment Note    Date: 4/11/2022  Name: Grisel Canales  Clinic Number: [id number]    Therapy Diagnosis: No diagnosis found.  Physician: Robin Jaeger MD    Physician Orders: Evaluate and treat.  Medical Diagnosis: Carpal tunnel syndrome on right [G56.01], Laceration of flexor muscle, fascia and tendon of right little finger at wrist and hand level, subsequent encounter [S66.349D]  Surgical Procedure and Date: 2/11/2022,   Evaluation Date: 2/23/2022  Insurance Authorization Period Expiration: 2/17/2022 - 2/17/2023  Plan of Care Expiration: 2/23/2022-4/13/2022    Visit # / Visits authorized: 11/ 13      Precautions:  Standard    Time In: 9:07  Time Out: 10:15  Total Billable Time: 68 minutes    SUBJECTIVE     Pt reports: she has gained a little ROM.  She was compliant with home exercise program given last session.   Response to previous treatment:  Functional change:     Pain: 0/10  Location: right fingers , hands  and wrists      OBJECTIVE     Objective Measures updated at progress report unless specified.    Treatment     Grisel received the treatments listed below:     Supervised modalities after being cleared for contradictions: Paraffin bath - 8min.    Direct contact modalities after being cleared for contraindications: Ultrasound -N/A    Manual therapy techniques: Joint mobilizations, Myofacial release, Soft tissue Mobilization and Friction Massage were applied to the: Right upper extremity for 36 minutes, including:  -RUE PROM-Wrist flx/ext- 2x10  , joint mobz/distraction- 1x5 , Tendon glides- 5x5 , digits 4/5   MCP flx/ext-  2x10, PIP flx/ext-2x10  , DIP flx/ext-  2x10,soft tissue massage- 25min.    Therapeutic exercises to develop strength, endurance, ROM and flexibility for 24 minutes, including:  -RUE AROM- wrist flx/ext-1x10, Place/hold exercises- 1x15, blocking exercises DIP joint 5th- 2x10, pip- 2x10, mcp 2x10        Patient Education and Home  Exercises      Education provided:   -   - Progress towards goals     Written Home Exercises Provided: Patient instructed to cont prior HEP.  Exercises were reviewed and Grisel was able to demonstrate them prior to the end of the session.  Grisel demonstrated good  understanding of the HEP provided. See EMR under Patient Instructions for exercises provided during therapy sessions.       Assessment     Pt would continue to benefit from skilled OT.      Grisel is progressing well towards her goals and there are no updates to goals at this time. Pt prognosis is Good.     Pt will continue to benefit from skilled outpatient occupational therapy to address the deficits listed in the problem list on initial evaluation provide pt/family education and to maximize pt's level of independence in the home and community environment.     Pt's spiritual, cultural and educational needs considered and pt agreeable to plan of care and goals.    Anticipated barriers to occupational therapy:     Goals:  Pt. Will increase AROM of Right Upper extremity as measured by goniometric measurements  Pt. Will increase Right /pinch strength as measured by dynamometer/pinch gauge.  Pt. Will demonstrate ability to utilize RUE to perform functional tasks (I).  Pt. Will be (I) with HEP.    PLAN     Continue OT POC.      YAA MATTHEWS, OT      Gen: A&O x 3; ND  Vitals; BP-112/56; P-75; T-36.7    Pulm-CTA B/L; no wheezes  Cor-clear S1S2; no murmurs  ABd exam-soft and nontender    NST cat II with mod variability; no accels and 4 min prolonged deceleration; Irreg ctx's  VE-1/80/-2    GBS neg  EFW~[id number]

## 2022-04-13 ENCOUNTER — CLINICAL SUPPORT (OUTPATIENT)
Dept: REHABILITATION | Facility: HOSPITAL | Age: 37
End: 2022-04-13
Payer: COMMERCIAL

## 2022-04-13 DIAGNOSIS — S66.126D LACERATION OF FLEXOR MUSCLE, FASCIA AND TENDON OF RIGHT LITTLE FINGER AT WRIST AND HAND LEVEL, SUBSEQUENT ENCOUNTER: Primary | ICD-10-CM

## 2022-04-13 PROCEDURE — 97018 PARAFFIN BATH THERAPY: CPT | Mod: 25

## 2022-04-13 PROCEDURE — 97110 THERAPEUTIC EXERCISES: CPT

## 2022-04-13 PROCEDURE — 97140 MANUAL THERAPY 1/> REGIONS: CPT

## 2022-04-13 NOTE — PROGRESS NOTES
[facility]  Occupational Therapy Treatment Note    Date: 4/13/2022  Name: Grisel Canales  Clinic Number: [id number]    Therapy Diagnosis: No diagnosis found.  Physician: Robin Jaeger MD    Physician Orders: Evaluate and treat.  Medical Diagnosis: Carpal tunnel syndrome on right [G56.01], Laceration of flexor muscle, fascia and tendon of right little finger at wrist and hand level, subsequent encounter [S66.566D]  Surgical Procedure and Date: 2/11/2022,   Evaluation Date: 2/23/2022  Insurance Authorization Period Expiration: 2/17/2022 - 2/17/2023  Plan of Care Expiration: 2/23/2022-4/13/2022    Visit # / Visits authorized: 12/ 13      Precautions:  Standard    Time In: 9:03  Time Out: 9:58  Total Billable Time: 55 minutes    SUBJECTIVE     Pt reports: she is sore today.  She was compliant with home exercise program given last session.   Response to previous treatment:  Functional change:     Pain: 0/10  Location: right fingers , hands  and wrists      OBJECTIVE     Objective Measures updated at progress report unless specified.    Treatment     Grisel received the treatments listed below:     Supervised modalities after being cleared for contradictions: Paraffin bath - 8min.    Direct contact modalities after being cleared for contraindications: Ultrasound -N/A    Manual therapy techniques: Joint mobilizations, Myofacial release, Soft tissue Mobilization and Friction Massage were applied to the: Right upper extremity for 27 minutes, including:  -RUE PROM-Wrist flx/ext- 2x10  , joint mobz/distraction- 1x5 , Tendon glides- 5x5 , digits 4/5   MCP flx/ext-  2x10, PIP flx/ext-2x10  , DIP flx/ext-  2x10,soft tissue massage- 25min.    Therapeutic exercises to develop strength, endurance, ROM and flexibility for 20 minutes, including:  -RUE AROM- wrist flx/ext-1x10, Place/hold exercises- 1x15, blocking exercises DIP joint 5th- 2x10, pip- 2x10, mcp 2x10        Patient Education and Home Exercises       Education provided:   -   - Progress towards goals     Written Home Exercises Provided: Patient instructed to cont prior HEP.  Exercises were reviewed and Grisel was able to demonstrate them prior to the end of the session.  Grisel demonstrated good  understanding of the HEP provided. See EMR under Patient Instructions for exercises provided during therapy sessions.       Assessment     Pt would continue to benefit from skilled OT.      Grisel is progressing well towards her goals and there are no updates to goals at this time. Pt prognosis is Good.     Pt will continue to benefit from skilled outpatient occupational therapy to address the deficits listed in the problem list on initial evaluation provide pt/family education and to maximize pt's level of independence in the home and community environment.     Pt's spiritual, cultural and educational needs considered and pt agreeable to plan of care and goals.    Anticipated barriers to occupational therapy:     Goals:  Pt. Will increase AROM of Right Upper extremity as measured by goniometric measurements  Pt. Will increase Right /pinch strength as measured by dynamometer/pinch gauge.  Pt. Will demonstrate ability to utilize RUE to perform functional tasks (I).  Pt. Will be (I) with HEP.    PLAN     Continue OT POC.      YAA MATTHEWS, OT

## 2022-04-20 ENCOUNTER — CLINICAL SUPPORT (OUTPATIENT)
Dept: REHABILITATION | Facility: HOSPITAL | Age: 37
End: 2022-04-20
Payer: COMMERCIAL

## 2022-04-20 DIAGNOSIS — S66.126D LACERATION OF FLEXOR MUSCLE, FASCIA AND TENDON OF RIGHT LITTLE FINGER AT WRIST AND HAND LEVEL, SUBSEQUENT ENCOUNTER: Primary | ICD-10-CM

## 2022-04-20 PROCEDURE — 97018 PARAFFIN BATH THERAPY: CPT

## 2022-04-20 PROCEDURE — 97110 THERAPEUTIC EXERCISES: CPT

## 2022-04-20 PROCEDURE — 97140 MANUAL THERAPY 1/> REGIONS: CPT

## 2022-04-20 NOTE — PROGRESS NOTES
[facility]  Occupational Therapy Treatment Note    Date: 4/20/2022  Name: Grisel Canales  Clinic Number: [id number]    Therapy Diagnosis: No diagnosis found.  Physician: Robin Jaeger MD    Physician Orders: Evaluate and treat.  Medical Diagnosis: Carpal tunnel syndrome on right [G56.01], Laceration of flexor muscle, fascia and tendon of right little finger at wrist and hand level, subsequent encounter [S66.797D]  Surgical Procedure and Date: 2/11/2022,   Evaluation Date: 2/23/2022  Insurance Authorization Period Expiration: 2/17/2022 - 2/17/2023  Plan of Care Expiration: 4/4/2022-6/1/2022    Visit # / Visits authorized: 13/ 13      Precautions:  Standard    Time In: 9:03  Time Out: 10:03  Total Billable Time: 60 minutes    SUBJECTIVE     Pt reports: somedays she has more range than others.  She was compliant with home exercise program given last session.   Response to previous treatment:  Functional change:     Pain: 0/10  Location: right fingers , hands  and wrists      OBJECTIVE     Objective Measures updated at progress report unless specified.    Treatment     Grisel received the treatments listed below:     Supervised modalities after being cleared for contradictions: Paraffin bath - 8min.    Direct contact modalities after being cleared for contraindications: Ultrasound -N/A    Manual therapy techniques: Joint mobilizations, Myofacial release, Soft tissue Mobilization and Friction Massage were applied to the: Right upper extremity for 30 minutes, including:  -RUE PROM-Wrist flx/ext- 2x10  , joint mobz/distraction- 1x5 , Tendon glides- 5x5 , digits 4/5   MCP flx/ext-  2x10, PIP flx/ext-2x10  , DIP flx/ext-  2x10,soft tissue massage- 25min.    Therapeutic exercises to develop strength, endurance, ROM and flexibility for 22 minutes, including:  -RUE AROM- wrist flx/ext-1x10, Place/hold exercises- 1x15, blocking exercises DIP joint 5th- 2x10, pip- 2x10, mcp 2x10        Patient Education  and Home Exercises      Education provided:   -   - Progress towards goals     Written Home Exercises Provided: Patient instructed to cont prior HEP.  Exercises were reviewed and Grisel was able to demonstrate them prior to the end of the session.  Grisel demonstrated good  understanding of the HEP provided. See EMR under Patient Instructions for exercises provided during therapy sessions.       Assessment     Pt would continue to benefit from skilled OT.      Grisel is progressing well towards her goals and there are no updates to goals at this time. Pt prognosis is Good.     Pt will continue to benefit from skilled outpatient occupational therapy to address the deficits listed in the problem list on initial evaluation provide pt/family education and to maximize pt's level of independence in the home and community environment.     Pt's spiritual, cultural and educational needs considered and pt agreeable to plan of care and goals.    Anticipated barriers to occupational therapy:     Goals:  Pt. Will increase AROM of Right Upper extremity as measured by goniometric measurements  Pt. Will increase Right /pinch strength as measured by dynamometer/pinch gauge.  Pt. Will demonstrate ability to utilize RUE to perform functional tasks (I).  Pt. Will be (I) with HEP.    PLAN   Scar tissue continues to limit motion, however seems to be slowly regaining some motion.  Continue OT POC.      YAA MATTHEWS, OT

## 2022-04-25 ENCOUNTER — CLINICAL SUPPORT (OUTPATIENT)
Dept: REHABILITATION | Facility: HOSPITAL | Age: 37
End: 2022-04-25
Payer: COMMERCIAL

## 2022-04-25 DIAGNOSIS — S66.126D LACERATION OF FLEXOR MUSCLE, FASCIA AND TENDON OF RIGHT LITTLE FINGER AT WRIST AND HAND LEVEL, SUBSEQUENT ENCOUNTER: Primary | ICD-10-CM

## 2022-04-25 PROCEDURE — 97140 MANUAL THERAPY 1/> REGIONS: CPT

## 2022-04-25 PROCEDURE — 97018 PARAFFIN BATH THERAPY: CPT | Mod: 59

## 2022-04-25 PROCEDURE — 97110 THERAPEUTIC EXERCISES: CPT

## 2022-04-25 NOTE — PROGRESS NOTES
[facility]  Occupational Therapy Treatment Note    Date: 4/25/2022  Name: Grisel Canales  Clinic Number: [id number]    Therapy Diagnosis: No diagnosis found.  Physician: Robin Jaeger MD    Physician Orders: Evaluate and treat.  Medical Diagnosis: Carpal tunnel syndrome on right [G56.01], Laceration of flexor muscle, fascia and tendon of right little finger at wrist and hand level, subsequent encounter [S66.126D]  Surgical Procedure and Date: 2/11/2022,   Evaluation Date: 2/23/2022  Insurance Authorization Period Expiration: 2/17/2022 - 2/17/2023  Plan of Care Expiration: 4/4/2022-6/1/2022    Visit # / Visits authorized: 14/ 26      Precautions:  Standard    Time In: 9:06  Time Out: 10:00  Total Billable Time:  54minutes    SUBJECTIVE     Pt reports: no new complaints.  She was compliant with home exercise program given last session.   Response to previous treatment:  Functional change:     Pain: 3/10  Location: right fingers , hands  and wrists      OBJECTIVE     Objective Measures updated at progress report unless specified.    Treatment     Grisel received the treatments listed below:     Supervised modalities after being cleared for contradictions: Paraffin bath - 8min.    Direct contact modalities after being cleared for contraindications: Ultrasound -N/A    Manual therapy techniques: Joint mobilizations, Myofacial release, Soft tissue Mobilization and Friction Massage were applied to the: Right upper extremity for 26 minutes, including:  -RUE PROM-Wrist flx/ext- 2x10  , joint mobz/distraction- 1x5 , Tendon glides- 5x5 , digits 4/5   MCP flx/ext-  2x10, PIP flx/ext-2x10  , DIP flx/ext-  2x10,soft tissue massage- 25min.    Therapeutic exercises to develop strength, endurance, ROM and flexibility for 20 minutes, including:  -RUE AROM- wrist flx/ext-1x10, Place/hold exercises- 1x15, blocking exercises DIP joint 5th- 2x10, pip- 2x10, mcp 2x10        Patient Education and Home Exercises       Education provided:   -   - Progress towards goals     Written Home Exercises Provided: Patient instructed to cont prior HEP.  Exercises were reviewed and Grisel was able to demonstrate them prior to the end of the session.  Grisel demonstrated good  understanding of the HEP provided. See EMR under Patient Instructions for exercises provided during therapy sessions.       Assessment     Pt would continue to benefit from skilled OT.      Grisel is progressing well towards her goals and there are no updates to goals at this time. Pt prognosis is Good.     Pt will continue to benefit from skilled outpatient occupational therapy to address the deficits listed in the problem list on initial evaluation provide pt/family education and to maximize pt's level of independence in the home and community environment.     Pt's spiritual, cultural and educational needs considered and pt agreeable to plan of care and goals.    Anticipated barriers to occupational therapy:     Goals:  Pt. Will increase AROM of Right Upper extremity as measured by goniometric measurements  Pt. Will increase Right /pinch strength as measured by dynamometer/pinch gauge.  Pt. Will demonstrate ability to utilize RUE to perform functional tasks (I).  Pt. Will be (I) with HEP.    PLAN   Scar tissue continues to limit range.  Continue OT POC.      YAA MATTHEWS, OT

## 2022-04-27 ENCOUNTER — CLINICAL SUPPORT (OUTPATIENT)
Dept: REHABILITATION | Facility: HOSPITAL | Age: 37
End: 2022-04-27
Payer: COMMERCIAL

## 2022-04-27 DIAGNOSIS — S66.126D LACERATION OF FLEXOR MUSCLE, FASCIA AND TENDON OF RIGHT LITTLE FINGER AT WRIST AND HAND LEVEL, SUBSEQUENT ENCOUNTER: Primary | ICD-10-CM

## 2022-04-27 PROCEDURE — 97140 MANUAL THERAPY 1/> REGIONS: CPT

## 2022-04-27 PROCEDURE — 97110 THERAPEUTIC EXERCISES: CPT

## 2022-04-27 NOTE — PROGRESS NOTES
[facility]  Occupational Therapy Treatment Note    Date: 4/27/2022  Name: Grisel Canales  Clinic Number: [id number]    Therapy Diagnosis: No diagnosis found.  Physician: Robin Jaeger MD    Physician Orders: Evaluate and treat.  Medical Diagnosis: Carpal tunnel syndrome on right [G56.01], Laceration of flexor muscle, fascia and tendon of right little finger at wrist and hand level, subsequent encounter [S66.585D]  Surgical Procedure and Date: 2/11/2022,   Evaluation Date: 2/23/2022  Insurance Authorization Period Expiration: 2/17/2022 - 2/17/2023  Plan of Care Expiration: 4/4/2022-6/1/2022    Visit # / Visits authorized: 15/ 26      Precautions:  Standard    Time In: 9:09  Time Out: 9:58  Total Billable Time:49  minutes    SUBJECTIVE     Pt reports: she can feel pip joint better today.  She was compliant with home exercise program given last session.   Response to previous treatment:  Functional change:     Pain: 2/10  Location: right fingers , hands  and wrists      OBJECTIVE     Objective Measures updated at progress report unless specified.    Treatment     Grisel received the treatments listed below:     Supervised modalities after being cleared for contradictions: Paraffin bath - N/A.    Direct contact modalities after being cleared for contraindications: Ultrasound -N/A    Manual therapy techniques: Joint mobilizations, Myofacial release, Soft tissue Mobilization and Friction Massage were applied to the: Right upper extremity for 29 minutes, including:  -RUE PROM-Wrist flx/ext- 2x10  , joint mobz/distraction- 1x5 , Tendon glides- 5x5 , digits 4/5   MCP flx/ext-  2x10, PIP flx/ext-2x10  , DIP flx/ext-  2x10,soft tissue massage- 25min.    Therapeutic exercises to develop strength, endurance, ROM and flexibility for 20 minutes, including:  -RUE AROM- wrist flx/ext-1x10, Place/hold exercises- 1x15, blocking exercises DIP joint 5th- 2x10, pip- 2x10, mcp 2x10        Patient Education and  Home Exercises      Education provided:   -   - Progress towards goals     Written Home Exercises Provided: Patient instructed to cont prior HEP.  Exercises were reviewed and Grisel was able to demonstrate them prior to the end of the session.  Grisel demonstrated good  understanding of the HEP provided. See EMR under Patient Instructions for exercises provided during therapy sessions.       Assessment     Pt would continue to benefit from skilled OT.      Grisel is progressing well towards her goals and there are no updates to goals at this time. Pt prognosis is Good.     Pt will continue to benefit from skilled outpatient occupational therapy to address the deficits listed in the problem list on initial evaluation provide pt/family education and to maximize pt's level of independence in the home and community environment.     Pt's spiritual, cultural and educational needs considered and pt agreeable to plan of care and goals.    Anticipated barriers to occupational therapy:     Goals:  Pt. Will increase AROM of Right Upper extremity as measured by goniometric measurements  Pt. Will increase Right /pinch strength as measured by dynamometer/pinch gauge.  Pt. Will demonstrate ability to utilize RUE to perform functional tasks (I).  Pt. Will be (I) with HEP.    PLAN   Scar tissue continues to limit range.  Continue OT POC.      YAA MATTHEWS, OT

## 2022-05-02 ENCOUNTER — CLINICAL SUPPORT (OUTPATIENT)
Dept: REHABILITATION | Facility: HOSPITAL | Age: 37
End: 2022-05-02
Payer: COMMERCIAL

## 2022-05-02 DIAGNOSIS — S66.126D LACERATION OF FLEXOR MUSCLE, FASCIA AND TENDON OF RIGHT LITTLE FINGER AT WRIST AND HAND LEVEL, SUBSEQUENT ENCOUNTER: Primary | ICD-10-CM

## 2022-05-02 PROCEDURE — 97110 THERAPEUTIC EXERCISES: CPT

## 2022-05-02 PROCEDURE — 97140 MANUAL THERAPY 1/> REGIONS: CPT

## 2022-05-02 NOTE — PROGRESS NOTES
[facility]  Occupational Therapy Treatment Note    Date: 5/2/2022  Name: Grisel Canales  Clinic Number: [id number]    Therapy Diagnosis: No diagnosis found.  Physician: Robin Jaeger MD    Physician Orders: Evaluate and treat.  Medical Diagnosis: Carpal tunnel syndrome on right [G56.01], Laceration of flexor muscle, fascia and tendon of right little finger at wrist and hand level, subsequent encounter [S66.126D]  Surgical Procedure and Date: 2/11/2022,   Evaluation Date: 2/23/2022  Insurance Authorization Period Expiration: 2/17/2022 - 2/17/2023  Plan of Care Expiration: 4/4/2022-6/1/2022    Visit # / Visits authorized: 16/ 26      Precautions:  Standard    Time In: 9:13  Time Out: 9:59  Total Billable Time:46 minutes    SUBJECTIVE     Pt reports: no new complaints.  She was compliant with home exercise program given last session.   Response to previous treatment:  Functional change:     Pain: 0/10  Location: right fingers , hands  and wrists      OBJECTIVE     Objective Measures updated at progress report unless specified.    Treatment     Grisel received the treatments listed below:     Supervised modalities after being cleared for contradictions: Paraffin bath - N/A.    Direct contact modalities after being cleared for contraindications: Ultrasound -N/A    Manual therapy techniques: Joint mobilizations, Myofacial release, Soft tissue Mobilization and Friction Massage were applied to the: Right upper extremity for 26 minutes, including:  -RUE PROM-Wrist flx/ext- 2x10  , joint mobz/distraction- 1x5 , Tendon glides- 5x5 , digits 4/5   MCP flx/ext-  2x10, PIP flx/ext-2x10  , DIP flx/ext-  2x10,soft tissue massage- 25min.    Therapeutic exercises to develop strength, endurance, ROM and flexibility for 20 minutes, including:  -RUE AROM- wrist flx/ext-1x10, Place/hold exercises- 1x15, blocking exercises DIP joint 5th- 2x10, pip- 2x10, mcp 2x10        Patient Education and Home Exercises       Education provided:   -   - Progress towards goals     Written Home Exercises Provided: Patient instructed to cont prior HEP.  Exercises were reviewed and Grisel was able to demonstrate them prior to the end of the session.  Grisel demonstrated good  understanding of the HEP provided. See EMR under Patient Instructions for exercises provided during therapy sessions.       Assessment     Pt would continue to benefit from skilled OT.      Grisel is progressing well towards her goals and there are no updates to goals at this time. Pt prognosis is Good.     Pt will continue to benefit from skilled outpatient occupational therapy to address the deficits listed in the problem list on initial evaluation provide pt/family education and to maximize pt's level of independence in the home and community environment.     Pt's spiritual, cultural and educational needs considered and pt agreeable to plan of care and goals.    Anticipated barriers to occupational therapy:     Goals:  Pt. Will increase AROM of Right Upper extremity as measured by goniometric measurements  Pt. Will increase Right /pinch strength as measured by dynamometer/pinch gauge.  Pt. Will demonstrate ability to utilize RUE to perform functional tasks (I).  Pt. Will be (I) with HEP.    PLAN   Scar tissue continues to limit range.  Continue OT POC.      YAA MATTHEWS, OT

## 2022-09-13 DIAGNOSIS — Z47.89 UNSPECIFIED ORTHOPEDIC AFTERCARE: ICD-10-CM

## 2022-09-13 DIAGNOSIS — S66.126D LACERATION OF FLEXOR MUSCLE, FASCIA AND TENDON OF RIGHT LITTLE FINGER AT WRIST AND HAND LEVEL, SUBSEQUENT ENCOUNTER: Primary | ICD-10-CM

## 2022-09-14 ENCOUNTER — DOCUMENTATION ONLY (OUTPATIENT)
Dept: REHABILITATION | Facility: HOSPITAL | Age: 37
End: 2022-09-14
Payer: COMMERCIAL

## 2022-09-14 NOTE — PROGRESS NOTES
[facility]   Discharge Note    Name: Grisel Canales  Clinic Number: [id number]    Therapy Diagnosis: No diagnosis found.  Physician: No ref. provider found    Physician Orders: Evaluate and treat.  Medical Diagnosis: Carpal tunnel syndrome on right [G56.01], Laceration of flexor muscle, fascia and tendon of right little finger at wrist and hand level, subsequent encounter [S66.126D]  Evaluation Date: 2/23/2022      Date of Last visit: 5/2/2022  Total Visits Received: 16    ASSESSMENT      Pt. Did not return to treatment. D/C to HEP.    Discharge reason: Patient has not attended therapy since 5/2/2022        Goals:   Pt. Will increase AROM of Right Upper extremity as measured by goniometric measurements  Pt. Will increase Right /pinch strength as measured by dynamometer/pinch gauge.  Pt. Will demonstrate ability to utilize RUE to perform functional tasks (I).  Pt. Will be (I) with HEP.    PLAN   This patient is discharged from Occupational Therapy      YAA MATTHEWS, OT

## 2022-09-15 ENCOUNTER — CLINICAL SUPPORT (OUTPATIENT)
Dept: REHABILITATION | Facility: HOSPITAL | Age: 37
End: 2022-09-15
Attending: ORTHOPAEDIC SURGERY
Payer: COMMERCIAL

## 2022-09-15 DIAGNOSIS — S66.126D LACERATION OF FLEXOR MUSCLE, FASCIA AND TENDON OF RIGHT LITTLE FINGER AT WRIST AND HAND LEVEL, SUBSEQUENT ENCOUNTER: ICD-10-CM

## 2022-09-15 PROCEDURE — 97166 OT EVAL MOD COMPLEX 45 MIN: CPT

## 2022-09-15 PROCEDURE — 97110 THERAPEUTIC EXERCISES: CPT

## 2022-09-15 PROCEDURE — 97140 MANUAL THERAPY 1/> REGIONS: CPT

## 2022-09-15 NOTE — PLAN OF CARE
[facility] Occupational Therapy  Initial Evaluation     Date: 9/15/2022  Name: Grisel Canales  Clinic Number: [id number]    Therapy Diagnosis: No diagnosis found.  Physician: Robin Jaeger MD    Physician Orders: Evaluate and treat.  Medical Diagnosis: Scar tenolysis  Surgical Procedure and Date: 9/2/2022, / Date of Injury/Onset: 2/11/2022  Evaluation Date: 9/15/2022  Insurance Authorization Period Expiration: 9/13/2022 - 9/13/2023  Plan of Care Certification Period: 9/15/2022-11/17/2022  Date of Return to MD: 2 weeks    Visit # / Visits authorized: 1 / 20    FOTO: initial eval  Medicare Amount:     Time In:10:00  Time Out: 10:54  Total treatment time: 54minutes      Precautions:  Standard    Subjective     Involved Side: Right upper extremity  Dominant Side: Right  Date of Onset: 9/2/2022  History of Current Condition: Pt. Has a history of tendon repair back in February of 2022. Pt. Did not regain full ROM due to scar tissue. Recently underwent scar tenolysis on 9/2/2022 of Right UE to gain increased ROM. Then referred to therapy.  Surgical Procedure: Scar tenolysis of RUE  Imaging: MRI studies, bone scan films       Past Medical History/Physical Systems Review:   Grisel Canales  has no past medical history on file.    Grisel Canales  has no past surgical history on file.    Grisel has a current medication list which includes the following prescription(s): humira(cf) pen, albuterol, amlodipine, dicyclomine, famotidine, gabapentin, irbesartan-hydrochlorothiazide, ketorolac, naproxen, oxybutynin, pantoprazole, potassium chloride, and topiramate.    Review of patient's allergies indicates:  No Known Allergies     Patient's Goals for Therapy: To be able to use RUE again.    Pain:  Functional Pain Scale Rating 0-10:   2/10 on average  1/10 at best  6/10 at worst  Location: Right wrist/hand  Description: Aching, Dull, Grabbing, and Tight  Aggravating Factors: Night Time, Morning, Extension, Flexing, Lifting,  and Getting out of bed/chair  Easing Factors: massage, relaxation, rest, and elevation    Occupation:   at [employer]  Working presently: employed      Functional Limitations/Social History:    Previous functional status includes: Independent with all ADLs.     Current FunctionalStatus   Home/Living environment : lives with their family      Limitation of Functional Status as follows:   ADLs/IADLs:     - Feeding: (I)    - Bathing: (I)    - Dressing/Grooming: (I)    - Driving: (I)             Objective     Observation/Appearance:     Edema. Measured in centimeters.   9/15/2022 9/15/2022    Right Left   2in. Above elbow     2in. Below elbow     Wrist Crease     Distal Palmar Crease 19.5cm 18.9cm   MCPs       Edema. Measured in centimeters.   9/15/2022 9/15/2022    Right Left   Index:       P1      PIP     P2      DIP     P3     Long:       P1      PIP     P2      DIP     P3     Ring:       P1                 PIP                P2                  DIP     P3     Small:        P1                 PIP            P2             DIP     P3     Thumb:     Prox. Phalanx     IP     Distal Phalanx       Elbow and Wrist ROM. Measured in degrees.   9/15/2022 9/15/2022    Right Left   Wrist Ext/Flex 40/64 69/65     Hand ROM. Measured in degrees.   9/15/2022 9/15/2022    Right Left        Index: MP  65 79              PIP     104 101              DIP 74 76              HYATT          Long:  MP 59 81              PIP 99 91              DIP 84 83              HYATT          Ring:   MP 81 82               86              DIP 79 96              HYATT          Small:  MP 64 88               PIP 47 86               DIP 58 86              HYATT          Thumb: MP 60 56                IP 66 79      Strength (Dynamometer) and Pinch Strength (Pinch Gauge)  Measured in pounds.   9/15/2022 9/15/2022    Right Left   Rung II 19 36   Key Pinch 10 15   3pt Pinch 7 10   2pt Pinch 4 9       Manual Muscle Test   9/15/2022 9/15/2022    Right  Left   Wrist Extension  4/5 5/5   Wrist Flexion 4/5 5/5         Special Tests  Thumb CMC Grind Test    Finkelstein's Test    Phalen's Test    Tinel's Test    Gigi's Test    Kennett-Littler Test    Digital Collateral Stress Test    ORL Test    Froment's Sign    Pinch OK Sign    Ernestina's Sign     Egawa Sign     Clamp Sign     SL Ballottement Test    LT Ballottement Test    Scaphoid/WatsonTest    Linscheid's Test    Metacarpal Stress Test    Piano Key Test    ECU Synergy Test    Ulnar Compression Test    TFCC Load Test    Ulnocarpal Stress Test    Midcarpal Shift Test    Pisiform Boost Test    Elbow Flexion Test    Scratch Collapse Test    Tennis Elbow Test    Resisted Middle Finger Extension Test    Mills Test    Chair Test    Biceps Squeeze Test    Biceps Hook Test    Milking Test    Press Up Manuever    PLRI Test    Valgus Stress Test    Varus Stress Test    Spurling Test    Cervical Distraction Test    ULNTT - General    ULNTT - Median Nerve    ULNTT - Radial Nerve    ULNTT - Ulnar Nerve         CMS Impairment/Limitation/Restriction for FOTO  Survey    Therapist reviewed FOTO scores for Grisel Canales on 9/15/2022.   FOTO documents entered into [email or web address] - see Media section.    Limitation Score: 39%         Treatment     Treatment Time In: 9:00  Treatment Time Out: 9:55      Grisel received the following supervised modalities after being cleared for contradictions for  minutes:   -N/A    Grisel received the following manual therapy techniques for  8minutes:   -RUE PROM- wrist flx/ext- 2x10, Tendon glides- 2x5,    Grisel received therapeutic exercises for 8 minutes including:  -RUE AROM- digit 5 MCP flx/ex- 2x10, PIP flx/ext- 2x10, DIP flx/ext- 2x10, Fisting - 2x10    Home Exercise Program/Education:  Issued HEP (see patient instructions in EMR) and educated on modality use for pain management . Exercises were reviewed and Grisel was able to demonstrate them prior to the end of the session.   Pt received a written copy of  exercises to perform at home. Grisel demonstrated good  understanding of the education provided.  Pt was advised to perform these exercises free of pain, and to stop performing them if pain occurs.    Patient/Family Education: role of OT, goals for OT, scheduling/cancellations - pt verbalized understanding. Discussed insurance limitations with patient.    Additional Education provided:     Assessment     Grisel Canales is a 37 y.o. female referred to outpatient occupational therapy and presents with a medical diagnosis of RUE tenolysis, resulting in decreased ROM/strength with increased pain/edema and demonstrates limitations as described above. Following medical record review it is determined that pt will benefit from occupational therapy services in order to maximize pain free and/or functional use of right UE. The following goals were discussed with the patient and patient is in agreement with them as to be addressed in the treatment plan. The patient's rehab potential is Good.     Anticipated barriers to occupational therapy:   Pt has no cultural, educational or language barriers to learning provided.        Goals:   The following goals were discussed with the patient and patient is in agreement with them as to be addressed in the treatment plan.   Short term Goals:  1) Initiate HEP  2) Pt will increase AROM of RUE by 5-10 degrees in order to assist with functional full fist by 4 weeks.  3) Pt will reduce edema by .5-1 cm in affected fingers by 4 weeks.  4) Pt will reduce pain to less than 4/10 by 4 weeks.  5) Pt will increase functional  strength by 5# in order to A in opening containers for med management or home management tasks by 4 weeks.   6) Patient will be able to achieve less than or equal to 39% on the FOTO, demonstrating overall improved functional ability with upper extremity. (Self-care category)    Long Term Goals:  Goals to be met by discharge:  1) Independent with HEP  2) Pt will increase RUE  HYATT by 20-30 degrees in order to increase functional fist for grasp with home management or work related tasks by d/c.   3) Pt will decrease edema to trace or none to increase functional ROM by d/c.   4) Pt will decrease pain to trace or none while completing light home management tasks or work related tasks by d/c.   5) Patient will be able to achieve less than or equal to 80% on the FOTO, demonstrating overall improved functional ability with upper extremity.  (Self-care category)        Plan   Certification Period/Plan of care expiration: 9/15/2022 to 11/17/2022.    Outpatient Occupational Therapy 2 times weekly for 9 weeks to include the following interventions: Paraffin, Fluidotherapy, Manual therapy/joint mobilizations, Modalities for pain management, US 3 mhz, Therapeutic exercises/activities., Iontophoresis with 2.0 cc Dexamethasone, Strengthening, Orthotic Fabrication/Fit/Training, Edema Control, Scar Management, Electrical Modalities, Joint Protection, and Energy Conservation.      YAA MATTHEWS, OT

## 2022-09-15 NOTE — PROGRESS NOTES
[facility] Occupational Therapy  Initial Evaluation     Date: 9/15/2022  Name: Grisel Canales  Clinic Number: [id number]    Therapy Diagnosis: No diagnosis found.  Physician: Robin Jaeger MD    Physician Orders: Evaluate and treat.  Medical Diagnosis: Scar tenolysis  Surgical Procedure and Date: 9/2/2022, / Date of Injury/Onset: 2/11/2022  Evaluation Date: 9/15/2022  Insurance Authorization Period Expiration: 9/13/2022 - 9/13/2023  Plan of Care Certification Period: 9/15/2022-11/17/2022  Date of Return to MD: 2 weeks    Visit # / Visits authorized: 1 / 20    FOTO: initial eval  Medicare Amount:     Time In:10:00  Time Out: 10:54  Total treatment time: 54minutes      Precautions:  Standard    Subjective     Involved Side: Right upper extremity  Dominant Side: Right  Date of Onset: 9/2/2022  History of Current Condition: Pt. Has a history of tendon repair back in February of 2022. Pt. Did not regain full ROM due to scar tissue. Recently underwent scar tenolysis on 9/2/2022 of Right UE to gain increased ROM. Then referred to therapy.  Surgical Procedure: Scar tenolysis of RUE  Imaging: MRI studies, bone scan films       Past Medical History/Physical Systems Review:   Grisel Canales  has no past medical history on file.    Grisel Canales  has no past surgical history on file.    Grisel has a current medication list which includes the following prescription(s): humira(cf) pen, albuterol, amlodipine, dicyclomine, famotidine, gabapentin, irbesartan-hydrochlorothiazide, ketorolac, naproxen, oxybutynin, pantoprazole, potassium chloride, and topiramate.    Review of patient's allergies indicates:  No Known Allergies     Patient's Goals for Therapy: To be able to use RUE again.    Pain:  Functional Pain Scale Rating 0-10:   2/10 on average  1/10 at best  6/10 at worst  Location: Right wrist/hand  Description: Aching, Dull, Grabbing, and Tight  Aggravating Factors: Night Time, Morning, Extension, Flexing, Lifting,  and Getting out of bed/chair  Easing Factors: massage, relaxation, rest, and elevation    Occupation:   at [employer]  Working presently: employed      Functional Limitations/Social History:    Previous functional status includes: Independent with all ADLs.     Current FunctionalStatus   Home/Living environment : lives with their family      Limitation of Functional Status as follows:   ADLs/IADLs:     - Feeding: (I)    - Bathing: (I)    - Dressing/Grooming: (I)    - Driving: (I)             Objective     Observation/Appearance:     Edema. Measured in centimeters.   9/15/2022 9/15/2022    Right Left   2in. Above elbow     2in. Below elbow     Wrist Crease     Distal Palmar Crease 19.5cm 18.9cm   MCPs       Edema. Measured in centimeters.   9/15/2022 9/15/2022    Right Left   Index:       P1      PIP     P2      DIP     P3     Long:       P1      PIP     P2      DIP     P3     Ring:       P1                 PIP                P2                  DIP     P3     Small:        P1                 PIP            P2             DIP     P3     Thumb:     Prox. Phalanx     IP     Distal Phalanx       Elbow and Wrist ROM. Measured in degrees.   9/15/2022 9/15/2022    Right Left   Wrist Ext/Flex 40/64 69/65     Hand ROM. Measured in degrees.   9/15/2022 9/15/2022    Right Left        Index: MP  65 79              PIP     104 101              DIP 74 76              HYATT          Long:  MP 59 81              PIP 99 91              DIP 84 83              HYATT          Ring:   MP 81 82               86              DIP 79 96              HYATT          Small:  MP 64 88               PIP 47 86               DIP 58 86              HYATT          Thumb: MP 60 56                IP 66 79      Strength (Dynamometer) and Pinch Strength (Pinch Gauge)  Measured in pounds.   9/15/2022 9/15/2022    Right Left   Rung II 19 36   Key Pinch 10 15   3pt Pinch 7 10   2pt Pinch 4 9       Manual Muscle Test   9/15/2022 9/15/2022    Right  Left   Wrist Extension  4/5 5/5   Wrist Flexion 4/5 5/5         Special Tests  Thumb CMC Grind Test    Finkelstein's Test    Phalen's Test    Tinel's Test    Gigi's Test    Mosheim-Littler Test    Digital Collateral Stress Test    ORL Test    Froment's Sign    Pinch OK Sign    Ernestina's Sign     Egawa Sign     Clamp Sign     SL Ballottement Test    LT Ballottement Test    Scaphoid/WatsonTest    Linscheid's Test    Metacarpal Stress Test    Piano Key Test    ECU Synergy Test    Ulnar Compression Test    TFCC Load Test    Ulnocarpal Stress Test    Midcarpal Shift Test    Pisiform Boost Test    Elbow Flexion Test    Scratch Collapse Test    Tennis Elbow Test    Resisted Middle Finger Extension Test    Mills Test    Chair Test    Biceps Squeeze Test    Biceps Hook Test    Milking Test    Press Up Manuever    PLRI Test    Valgus Stress Test    Varus Stress Test    Spurling Test    Cervical Distraction Test    ULNTT - General    ULNTT - Median Nerve    ULNTT - Radial Nerve    ULNTT - Ulnar Nerve         CMS Impairment/Limitation/Restriction for FOTO  Survey    Therapist reviewed FOTO scores for Grisel Canales on 9/15/2022.   FOTO documents entered into theScore - see Media section.    Limitation Score: 39%         Treatment     Treatment Time In: 9:00  Treatment Time Out: 9:55      Grisel received the following supervised modalities after being cleared for contradictions for  minutes:   -N/A    Grisel received the following manual therapy techniques for  8minutes:   -RUE PROM- wrist flx/ext- 2x10, Tendon glides- 2x5,    Grisel received therapeutic exercises for 8 minutes including:  -RUE AROM- digit 5 MCP flx/ex- 2x10, PIP flx/ext- 2x10, DIP flx/ext- 2x10, Fisting - 2x10    Home Exercise Program/Education:  Issued HEP (see patient instructions in EMR) and educated on modality use for pain management . Exercises were reviewed and Grisel was able to demonstrate them prior to the end of the session.   Pt received a written copy of  exercises to perform at home. Grisel demonstrated good  understanding of the education provided.  Pt was advised to perform these exercises free of pain, and to stop performing them if pain occurs.    Patient/Family Education: role of OT, goals for OT, scheduling/cancellations - pt verbalized understanding. Discussed insurance limitations with patient.    Additional Education provided:     Assessment     Grisel Canales is a 37 y.o. female referred to outpatient occupational therapy and presents with a medical diagnosis of RUE tenolysis, resulting in decreased ROM/strength with increased pain/edema and demonstrates limitations as described above. Following medical record review it is determined that pt will benefit from occupational therapy services in order to maximize pain free and/or functional use of right UE. The following goals were discussed with the patient and patient is in agreement with them as to be addressed in the treatment plan. The patient's rehab potential is Good.     Anticipated barriers to occupational therapy:   Pt has no cultural, educational or language barriers to learning provided.        Goals:   The following goals were discussed with the patient and patient is in agreement with them as to be addressed in the treatment plan.   Short term Goals:  1) Initiate HEP  2) Pt will increase AROM of RUE by 5-10 degrees in order to assist with functional full fist by 4 weeks.  3) Pt will reduce edema by .5-1 cm in affected fingers by 4 weeks.  4) Pt will reduce pain to less than 4/10 by 4 weeks.  5) Pt will increase functional  strength by 5# in order to A in opening containers for med management or home management tasks by 4 weeks.   6) Patient will be able to achieve less than or equal to 39% on the FOTO, demonstrating overall improved functional ability with upper extremity. (Self-care category)    Long Term Goals:  Goals to be met by discharge:  1) Independent with HEP  2) Pt will increase RUE  HYATT by 20-30 degrees in order to increase functional fist for grasp with home management or work related tasks by d/c.   3) Pt will decrease edema to trace or none to increase functional ROM by d/c.   4) Pt will decrease pain to trace or none while completing light home management tasks or work related tasks by d/c.   5) Patient will be able to achieve less than or equal to 80% on the FOTO, demonstrating overall improved functional ability with upper extremity.  (Self-care category)        Plan   Certification Period/Plan of care expiration: 9/15/2022 to 11/17/2022.    Outpatient Occupational Therapy 2 times weekly for 9 weeks to include the following interventions: Paraffin, Fluidotherapy, Manual therapy/joint mobilizations, Modalities for pain management, US 3 mhz, Therapeutic exercises/activities., Iontophoresis with 2.0 cc Dexamethasone, Strengthening, Orthotic Fabrication/Fit/Training, Edema Control, Scar Management, Electrical Modalities, Joint Protection, and Energy Conservation.      YAA MATTHEWS, OT

## 2022-09-22 ENCOUNTER — CLINICAL SUPPORT (OUTPATIENT)
Dept: REHABILITATION | Facility: HOSPITAL | Age: 37
End: 2022-09-22
Attending: ORTHOPAEDIC SURGERY
Payer: COMMERCIAL

## 2022-09-22 DIAGNOSIS — S66.126D LACERATION OF FLEXOR MUSCLE, FASCIA AND TENDON OF RIGHT LITTLE FINGER AT WRIST AND HAND LEVEL, SUBSEQUENT ENCOUNTER: Primary | ICD-10-CM

## 2022-09-22 PROCEDURE — 97140 MANUAL THERAPY 1/> REGIONS: CPT

## 2022-09-22 PROCEDURE — 97110 THERAPEUTIC EXERCISES: CPT

## 2022-09-22 PROCEDURE — 97018 PARAFFIN BATH THERAPY: CPT

## 2022-09-22 NOTE — PROGRESS NOTES
[facility]  Occupational Therapy Treatment Note    Date: 9/22/2022  Name: Grisel Canales  Clinic Number: [id number]    Therapy Diagnosis: No diagnosis found.  Physician: Robin Jaeger MD    Physician Orders: Evaluate and treat.  Medical Diagnosis: Scar tenolysis of RUE  Surgical Procedure and Date: 9/2/2022,   Evaluation Date: 9/15/2022  Insurance Authorization Period Expiration: 9/13/2022 - 9/13/2023  Plan of Care Expiration: 9/15/2022-11/17/2022    Visit # / Visits authorized: 2 / 20      Precautions:  Standard    Time In: 11:01  Time Out: 11:56  Total Billable Time: 55 minutes    SUBJECTIVE     Pt reports: no pain today.  She was compliant with home exercise program given last session.   Response to previous treatment:  Functional change:     Pain: 0/10  Location: right fingers , hands , and wrists      OBJECTIVE     Objective Measures updated at progress report unless specified.    Treatment     Grisel received the treatments listed below:     Supervised modalities after being cleared for contradictions: Paraffin bath - N/A    Direct contact modalities after being cleared for contraindications: Ultrasound - 8min. Cont. 3.0 MHZ, 1.0 wcm2    Manual therapy techniques: Joint mobilizations, Myofacial release, Soft tissue Mobilization, and Friction Massage were applied to the: RUE for 20 minutes, including:  -RUE PROM- wrist flx/ext- 2x10, Tendon glides- 4x5, carpal bone spread- 2x10, Joint mobz/distraction- 2x10, soft tissue massage- 15min.    Therapeutic exercises to develop strength, endurance, ROM, and flexibility for 27 minutes, including:  -RUE AROM- Handgripper- 3x10, intrinsic towel exercise- 3x10, Fisting- 3x10, digit 5 rband- PIP flx/ext- 3x10, DIP flx/ext- 3x10,  isometric pip flx/ext- 2x10, dip flx/ext- 3x10, abd/add- 3x10,         Patient Education and Home Exercises      Education provided:   -   - Progress towards goals     Written Home Exercises Provided: Patient instructed  to cont prior HEP.  Exercises were reviewed and Girsel was able to demonstrate them prior to the end of the session.  Grisel demonstrated fair  understanding of the HEP provided. See EMR under Patient Instructions for exercises provided during therapy sessions.       Assessment     Pt would continue to benefit from skilled OT.      Grisel is progressing well towards her goals and there are no updates to goals at this time. Pt prognosis is Good.     Pt will continue to benefit from skilled outpatient occupational therapy to address the deficits listed in the problem list on initial evaluation provide pt/family education and to maximize pt's level of independence in the home and community environment.     Pt's spiritual, cultural and educational needs considered and pt agreeable to plan of care and goals.    Anticipated barriers to occupational therapy:     Goals:  Pt. Will increase AROM of RUE as measured by goniometric measurements.  Pt. Will increase Right /pinch strength as measured by dynamometer/pinch gauge.  Pt. Will demonstrate ability to utilize RUE to perform functional tasks (I).  Pt. Will be (I) with HEP.    PLAN       Continue OT POC.      YAA MATTHEWS, OT

## 2022-09-27 ENCOUNTER — CLINICAL SUPPORT (OUTPATIENT)
Dept: REHABILITATION | Facility: HOSPITAL | Age: 37
End: 2022-09-27
Attending: ORTHOPAEDIC SURGERY
Payer: COMMERCIAL

## 2022-09-27 DIAGNOSIS — S66.126D LACERATION OF FLEXOR MUSCLE, FASCIA AND TENDON OF RIGHT LITTLE FINGER AT WRIST AND HAND LEVEL, SUBSEQUENT ENCOUNTER: Primary | ICD-10-CM

## 2022-09-27 PROCEDURE — 97110 THERAPEUTIC EXERCISES: CPT

## 2022-09-27 PROCEDURE — 97140 MANUAL THERAPY 1/> REGIONS: CPT

## 2022-09-27 PROCEDURE — 97035 APP MDLTY 1+ULTRASOUND EA 15: CPT

## 2022-09-27 NOTE — PROGRESS NOTES
[facility]  Occupational Therapy Treatment Note    Date: 9/27/2022  Name: Grisel Canales  Clinic Number: [id number]    Therapy Diagnosis: No diagnosis found.  Physician: Robin Jaeger MD    Physician Orders: Evaluate and treat.  Medical Diagnosis: Scar tenolysis of RUE  Surgical Procedure and Date: 9/2/2022,   Evaluation Date: 9/15/2022  Insurance Authorization Period Expiration: 9/13/2022 - 9/13/2023  Plan of Care Expiration: 9/15/2022-11/17/2022    Visit # / Visits authorized: 3/ 20      Precautions:  Standard    Time In: 10:02  Time Out: 10:56  Total Billable Time:  54minutes    SUBJECTIVE     Pt reports: no pain today.  She was compliant with home exercise program given last session.   Response to previous treatment:  Functional change:     Pain: 0/10  Location: right fingers , hands , and wrists      OBJECTIVE     Objective Measures updated at progress report unless specified.    Treatment     Grisel received the treatments listed below:     Supervised modalities after being cleared for contradictions: Paraffin bath - N/A    Direct contact modalities after being cleared for contraindications: Ultrasound - 8min. Cont. 3.0 MHZ, 1.0 wcm2    Manual therapy techniques: Joint mobilizations, Myofacial release, Soft tissue Mobilization, and Friction Massage were applied to the: RUE for 20 minutes, including:  -RUE PROM- wrist flx/ext- 2x10, Tendon glides- 4x5, carpal bone spread- 2x10, Joint mobz/distraction- 2x10, soft tissue massage- 15min.    Therapeutic exercises to develop strength, endurance, ROM, and flexibility for 26 minutes, including:  -RUE AROM- Handgripper- 3x10, intrinsic towel exercise- 3x10, Fisting- 3x10, digit 5 rband- PIP flx/ext- 3x10, DIP flx/ext- 3x10,  isometric pip flx/ext- 2x10, dip flx/ext- 3x10, abd/add- 3x10,         Patient Education and Home Exercises      Education provided:   -   - Progress towards goals     Written Home Exercises Provided: Patient instructed  to cont prior HEP.  Exercises were reviewed and Grisel was able to demonstrate them prior to the end of the session.  Grisel demonstrated fair  understanding of the HEP provided. See EMR under Patient Instructions for exercises provided during therapy sessions.       Assessment     Pt would continue to benefit from skilled OT.      Grisel is progressing well towards her goals and there are no updates to goals at this time. Pt prognosis is Good.     Pt will continue to benefit from skilled outpatient occupational therapy to address the deficits listed in the problem list on initial evaluation provide pt/family education and to maximize pt's level of independence in the home and community environment.     Pt's spiritual, cultural and educational needs considered and pt agreeable to plan of care and goals.    Anticipated barriers to occupational therapy:     Goals:  Pt. Will increase AROM of RUE as measured by goniometric measurements.  Pt. Will increase Right /pinch strength as measured by dynamometer/pinch gauge.  Pt. Will demonstrate ability to utilize RUE to perform functional tasks (I).  Pt. Will be (I) with HEP.    PLAN       Continue OT POC.      YAA MATTHEWS, OT

## 2022-09-29 ENCOUNTER — CLINICAL SUPPORT (OUTPATIENT)
Dept: REHABILITATION | Facility: HOSPITAL | Age: 37
End: 2022-09-29
Attending: ORTHOPAEDIC SURGERY
Payer: COMMERCIAL

## 2022-09-29 DIAGNOSIS — S66.126D LACERATION OF FLEXOR MUSCLE, FASCIA AND TENDON OF RIGHT LITTLE FINGER AT WRIST AND HAND LEVEL, SUBSEQUENT ENCOUNTER: Primary | ICD-10-CM

## 2022-09-29 PROCEDURE — 97140 MANUAL THERAPY 1/> REGIONS: CPT

## 2022-09-29 PROCEDURE — 97018 PARAFFIN BATH THERAPY: CPT

## 2022-09-29 PROCEDURE — 97110 THERAPEUTIC EXERCISES: CPT

## 2022-09-29 NOTE — PROGRESS NOTES
[facility]  Occupational Therapy Treatment Note    Date: 9/29/2022  Name: Grisel Canales  Clinic Number: [id number]    Therapy Diagnosis: No diagnosis found.  Physician: Robin Jaeger MD    Physician Orders: Evaluate and treat.  Medical Diagnosis: Scar tenolysis of RUE  Surgical Procedure and Date: 9/2/2022,   Evaluation Date: 9/15/2022  Insurance Authorization Period Expiration: 9/13/2022 - 9/13/2023  Plan of Care Expiration: 9/15/2022-11/17/2022    Visit # / Visits authorized: 4/ 20      Precautions:  Standard    Time In: 10:02  Time Out: 11:02  Total Billable Time: 60 minutes    SUBJECTIVE     Pt reports: no pain today.  She was compliant with home exercise program given last session.   Response to previous treatment:  Functional change:     Pain: 0/10  Location: right fingers , hands , and wrists      OBJECTIVE     Objective Measures updated at progress report unless specified.    Treatment     Grisel received the treatments listed below:     Supervised modalities after being cleared for contradictions: Paraffin bath - N/A    Direct contact modalities after being cleared for contraindications: Ultrasound - 8min. Cont. 3.0 MHZ, 1.0 wcm2    Manual therapy techniques: Joint mobilizations, Myofacial release, Soft tissue Mobilization, and Friction Massage were applied to the: RUE for 22 minutes, including:  -RUE PROM- wrist flx/ext- 2x10, Tendon glides- 4x5, carpal bone spread- 2x10, Joint mobz/distraction- 2x10, soft tissue massage- 15min.    Therapeutic exercises to develop strength, endurance, ROM, and flexibility for 30 minutes, including:  -RUE AROM- Handgripper- 3x10, intrinsic towel exercise- 3x10, Fisting- 3x10, digit 5 rband- PIP flx/ext- 3x10, DIP flx/ext- 3x10,  isometric pip flx/ext- 2x10, dip flx/ext- 3x10, abd/add- 3x10,         Patient Education and Home Exercises      Education provided:   -   - Progress towards goals     Written Home Exercises Provided: Patient instructed  to cont prior HEP.  Exercises were reviewed and Grisel was able to demonstrate them prior to the end of the session.  Grisel demonstrated fair  understanding of the HEP provided. See EMR under Patient Instructions for exercises provided during therapy sessions.       Assessment     Pt would continue to benefit from skilled OT.      Grisel is progressing well towards her goals and there are no updates to goals at this time. Pt prognosis is Good.     Pt will continue to benefit from skilled outpatient occupational therapy to address the deficits listed in the problem list on initial evaluation provide pt/family education and to maximize pt's level of independence in the home and community environment.     Pt's spiritual, cultural and educational needs considered and pt agreeable to plan of care and goals.    Anticipated barriers to occupational therapy:     Goals:  Pt. Will increase AROM of RUE as measured by goniometric measurements.  Pt. Will increase Right /pinch strength as measured by dynamometer/pinch gauge.  Pt. Will demonstrate ability to utilize RUE to perform functional tasks (I).  Pt. Will be (I) with HEP.    PLAN       Continue OT POC.      YAA MATTHEWS, OT

## 2022-10-04 ENCOUNTER — CLINICAL SUPPORT (OUTPATIENT)
Dept: REHABILITATION | Facility: HOSPITAL | Age: 37
End: 2022-10-04
Attending: ORTHOPAEDIC SURGERY
Payer: COMMERCIAL

## 2022-10-04 DIAGNOSIS — S66.126D LACERATION OF FLEXOR MUSCLE, FASCIA AND TENDON OF RIGHT LITTLE FINGER AT WRIST AND HAND LEVEL, SUBSEQUENT ENCOUNTER: Primary | ICD-10-CM

## 2022-10-04 PROCEDURE — 97035 APP MDLTY 1+ULTRASOUND EA 15: CPT

## 2022-10-04 PROCEDURE — 97110 THERAPEUTIC EXERCISES: CPT

## 2022-10-04 PROCEDURE — 97140 MANUAL THERAPY 1/> REGIONS: CPT

## 2022-10-04 NOTE — PROGRESS NOTES
[facility]  Occupational Therapy Treatment Note    Date: 10/4/2022  Name: Grisel Canales  Clinic Number: [id number]    Therapy Diagnosis: No diagnosis found.  Physician: Robin Jaeger MD    Physician Orders: Evaluate and treat.  Medical Diagnosis: Scar tenolysis of RUE  Surgical Procedure and Date: 9/2/2022,   Evaluation Date: 9/15/2022  Insurance Authorization Period Expiration: 9/13/2022 - 9/13/2023  Plan of Care Expiration: 9/15/2022-11/17/2022    Visit # / Visits authorized: 5/ 20      Precautions:  Standard    Time In: 10:14  Time Out: 11:14  Total Billable Time:60 minutes    SUBJECTIVE     Pt reports: no pain today.  She was compliant with home exercise program given last session.   Response to previous treatment:  Functional change:     Pain: 0/10  Location: right fingers , hands , and wrists      OBJECTIVE     Objective Measures updated at progress report unless specified.    Treatment     Grisel received the treatments listed below:     Supervised modalities after being cleared for contradictions: Paraffin bath - N/A    Direct contact modalities after being cleared for contraindications: Ultrasound - 8min. Cont. 3.0 MHZ, 1.0 wcm2    Manual therapy techniques: Joint mobilizations, Myofacial release, Soft tissue Mobilization, and Friction Massage were applied to the: RUE for 22 minutes, including:  -RUE PROM- wrist flx/ext- 2x10, Tendon glides- 4x5, carpal bone spread- 2x10, Joint mobz/distraction- 2x10, soft tissue massage- 15min.    Therapeutic exercises to develop strength, endurance, ROM, and flexibility for 30 minutes, including:  -RUE AROM- Handgripper- 3x10, intrinsic towel exercise- 3x10, Fisting- 3x10, digit 5 rband- PIP flx/ext- 3x10, DIP flx/ext- 3x10,  isometric pip flx/ext- 2x10, dip flx/ext- 3x10, abd/add- 3x10, Power web flx- 3x10        Patient Education and Home Exercises      Education provided:   -   - Progress towards goals     Written Home Exercises Provided:  Patient instructed to cont prior HEP.  Exercises were reviewed and Grisel was able to demonstrate them prior to the end of the session.  Grisel demonstrated fair  understanding of the HEP provided. See EMR under Patient Instructions for exercises provided during therapy sessions.       Assessment     Pt would continue to benefit from skilled OT.      Grisel is progressing well towards her goals and there are no updates to goals at this time. Pt prognosis is Good.     Pt will continue to benefit from skilled outpatient occupational therapy to address the deficits listed in the problem list on initial evaluation provide pt/family education and to maximize pt's level of independence in the home and community environment.     Pt's spiritual, cultural and educational needs considered and pt agreeable to plan of care and goals.    Anticipated barriers to occupational therapy:     Goals:  Pt. Will increase AROM of RUE as measured by goniometric measurements.  Pt. Will increase Right /pinch strength as measured by dynamometer/pinch gauge.  Pt. Will demonstrate ability to utilize RUE to perform functional tasks (I).  Pt. Will be (I) with HEP.    PLAN       Continue OT POC.      YAA MATTHEWS, OT

## 2022-10-05 ENCOUNTER — CLINICAL SUPPORT (OUTPATIENT)
Dept: REHABILITATION | Facility: HOSPITAL | Age: 37
End: 2022-10-05
Attending: ORTHOPAEDIC SURGERY
Payer: COMMERCIAL

## 2022-10-05 DIAGNOSIS — S66.126D LACERATION OF FLEXOR MUSCLE, FASCIA AND TENDON OF RIGHT LITTLE FINGER AT WRIST AND HAND LEVEL, SUBSEQUENT ENCOUNTER: Primary | ICD-10-CM

## 2022-10-05 PROCEDURE — 97110 THERAPEUTIC EXERCISES: CPT

## 2022-10-05 PROCEDURE — 97140 MANUAL THERAPY 1/> REGIONS: CPT

## 2022-10-05 PROCEDURE — 97035 APP MDLTY 1+ULTRASOUND EA 15: CPT

## 2022-10-05 NOTE — PROGRESS NOTES
[facility]  Occupational Therapy Treatment Note    Date: 10/5/2022  Name: Grisel Canales  Clinic Number: [id number]    Therapy Diagnosis: No diagnosis found.  Physician: Robin Jaeger MD    Physician Orders: Evaluate and treat.  Medical Diagnosis: Scar tenolysis of RUE  Surgical Procedure and Date: 9/2/2022,   Evaluation Date: 9/15/2022  Insurance Authorization Period Expiration: 9/13/2022 - 9/13/2023  Plan of Care Expiration: 9/15/2022-11/17/2022    Visit # / Visits authorized: 6/ 20      Precautions:  Standard    Time In: 9:05  Time Out: 10:00  Total Billable Time:55 minutes    SUBJECTIVE     Pt reports: no pain today.  She was compliant with home exercise program given last session.   Response to previous treatment:  Functional change:     Pain: 0/10  Location: right fingers , hands , and wrists      OBJECTIVE     Objective Measures updated at progress report unless specified.    Treatment     Grisel received the treatments listed below:     Supervised modalities after being cleared for contradictions: Paraffin bath - N/A    Direct contact modalities after being cleared for contraindications: Ultrasound - 8min. Cont. 3.0 MHZ, 1.0 wcm2    Manual therapy techniques: Joint mobilizations, Myofacial release, Soft tissue Mobilization, and Friction Massage were applied to the: RUE for 17 minutes, including:  -RUE PROM- wrist flx/ext- 2x10, Tendon glides- 4x5, carpal bone spread- 2x10, Joint mobz/distraction- 2x10,     Therapeutic exercises to develop strength, endurance, ROM, and flexibility for 30 minutes, including:  -RUE AROM- Handgripper- 3x10, intrinsic towel exercise- 3x10, Fisting- 3x10, digit 5 rband- PIP flx/ext- 3x10, DIP flx/ext- 3x10,  isometric pip flx/ext- 2x10, dip flx/ext- 3x10, abd/add- 3x10, Power web flx- 3x10        Patient Education and Home Exercises      Education provided:   -   - Progress towards goals     Written Home Exercises Provided: Patient instructed to cont  prior HEP.  Exercises were reviewed and Grisel was able to demonstrate them prior to the end of the session.  Grisel demonstrated fair  understanding of the HEP provided. See EMR under Patient Instructions for exercises provided during therapy sessions.       Assessment     Pt would continue to benefit from skilled OT.      Grisel is progressing well towards her goals and there are no updates to goals at this time. Pt prognosis is Good.     Pt will continue to benefit from skilled outpatient occupational therapy to address the deficits listed in the problem list on initial evaluation provide pt/family education and to maximize pt's level of independence in the home and community environment.     Pt's spiritual, cultural and educational needs considered and pt agreeable to plan of care and goals.    Anticipated barriers to occupational therapy:     Goals:  Pt. Will increase AROM of RUE as measured by goniometric measurements.  Pt. Will increase Right /pinch strength as measured by dynamometer/pinch gauge.  Pt. Will demonstrate ability to utilize RUE to perform functional tasks (I).  Pt. Will be (I) with HEP.    PLAN       Continue OT POC.      YAA MATTHEWS, OT

## 2022-10-12 ENCOUNTER — DOCUMENTATION ONLY (OUTPATIENT)
Dept: REHABILITATION | Facility: HOSPITAL | Age: 37
End: 2022-10-12
Payer: COMMERCIAL

## 2022-10-18 ENCOUNTER — CLINICAL SUPPORT (OUTPATIENT)
Dept: REHABILITATION | Facility: HOSPITAL | Age: 37
End: 2022-10-18
Attending: ORTHOPAEDIC SURGERY
Payer: COMMERCIAL

## 2022-10-18 DIAGNOSIS — S66.126D LACERATION OF FLEXOR MUSCLE, FASCIA AND TENDON OF RIGHT LITTLE FINGER AT WRIST AND HAND LEVEL, SUBSEQUENT ENCOUNTER: Primary | ICD-10-CM

## 2022-10-18 PROCEDURE — 97140 MANUAL THERAPY 1/> REGIONS: CPT

## 2022-10-18 PROCEDURE — 97018 PARAFFIN BATH THERAPY: CPT

## 2022-10-18 PROCEDURE — 97110 THERAPEUTIC EXERCISES: CPT

## 2022-10-18 NOTE — PROGRESS NOTES
[facility]  Occupational Therapy Treatment Note    Date: 10/18/2022  Name: Grisel Canales  Clinic Number: [id number]    Therapy Diagnosis: No diagnosis found.  Physician: Robin Jaeger MD    Physician Orders: Evaluate and treat.  Medical Diagnosis: Scar tenolysis of RUE  Surgical Procedure and Date: 9/2/2022,   Evaluation Date: 9/15/2022  Insurance Authorization Period Expiration: 9/13/2022 - 9/13/2023  Plan of Care Expiration: 9/15/2022-11/17/2022    Visit # / Visits authorized: 7/ 20      Precautions:  Standard    Time In: 10:05  Time Out: 10:59  Total Billable Time:54minutes    SUBJECTIVE     Pt reports: no pain today.  She was compliant with home exercise program given last session.   Response to previous treatment:  Functional change:     Pain: 0/10  Location: right fingers , hands , and wrists      OBJECTIVE     Objective Measures updated at progress report unless specified.    Treatment     Grisel received the treatments listed below:     Supervised modalities after being cleared for contradictions: Paraffin bath - 8min.    Direct contact modalities after being cleared for contraindications: Ultrasound - N/A    Manual therapy techniques: Joint mobilizations, Myofacial release, Soft tissue Mobilization, and Friction Massage were applied to the: RUE for 20 minutes, including:  -RUE PROM- wrist flx/ext- 2x10, Tendon glides- 4x5, carpal bone spread- 2x10, Joint mobz/distraction- 2x10,     Therapeutic exercises to develop strength, endurance, ROM, and flexibility for 26 minutes, including:  -RUE AROM- Handgripper- 3x10, intrinsic towel exercise- 3x10, Fisting- 3x10, digit 5 rband- PIP flx/ext- 3x10, DIP flx/ext- 3x10,  isometric pip flx/ext- 2x10, dip flx/ext- 3x10, abd/add- 3x10, Power web flx- 3x10        Patient Education and Home Exercises      Education provided:   -   - Progress towards goals     Written Home Exercises Provided: Patient instructed to cont prior HEP.  Exercises  were reviewed and Grisel was able to demonstrate them prior to the end of the session.  Grisel demonstrated fair  understanding of the HEP provided. See EMR under Patient Instructions for exercises provided during therapy sessions.       Assessment     Pt would continue to benefit from skilled OT.      Grisel is progressing well towards her goals and there are no updates to goals at this time. Pt prognosis is Good.     Pt will continue to benefit from skilled outpatient occupational therapy to address the deficits listed in the problem list on initial evaluation provide pt/family education and to maximize pt's level of independence in the home and community environment.     Pt's spiritual, cultural and educational needs considered and pt agreeable to plan of care and goals.    Anticipated barriers to occupational therapy:     Goals:  Pt. Will increase AROM of RUE as measured by goniometric measurements.  Pt. Will increase Right /pinch strength as measured by dynamometer/pinch gauge.  Pt. Will demonstrate ability to utilize RUE to perform functional tasks (I).  Pt. Will be (I) with HEP.    PLAN       Continue OT POC.      YAA MATTHEWS, OT

## 2022-10-24 ENCOUNTER — CLINICAL SUPPORT (OUTPATIENT)
Dept: REHABILITATION | Facility: HOSPITAL | Age: 37
End: 2022-10-24
Payer: COMMERCIAL

## 2022-10-24 DIAGNOSIS — S66.126D LACERATION OF FLEXOR MUSCLE, FASCIA AND TENDON OF RIGHT LITTLE FINGER AT WRIST AND HAND LEVEL, SUBSEQUENT ENCOUNTER: Primary | ICD-10-CM

## 2022-10-24 PROCEDURE — 97140 MANUAL THERAPY 1/> REGIONS: CPT

## 2022-10-24 PROCEDURE — 97110 THERAPEUTIC EXERCISES: CPT

## 2022-10-24 NOTE — PROGRESS NOTES
[facility]  Occupational Therapy Treatment Note    Date: 10/24/2022  Name: Grisel Canales  Clinic Number: [id number]    Therapy Diagnosis: No diagnosis found.  Physician: Robin Jaeger MD    Physician Orders: Evaluate and treat.  Medical Diagnosis: Scar tenolysis of RUE  Surgical Procedure and Date: 9/2/2022,   Evaluation Date: 9/15/2022  Insurance Authorization Period Expiration: 9/13/2022 - 9/13/2023  Plan of Care Expiration: 9/15/2022-11/17/2022    Visit # / Visits authorized: 8/ 20      Precautions:  Standard    Time In: 11:12  Time Out: 11:59  Total Billable Time:47minutes    SUBJECTIVE     Pt reports: no pain today.  She was compliant with home exercise program given last session.   Response to previous treatment:  Functional change:     Pain: 0/10  Location: right fingers , hands , and wrists      OBJECTIVE     Objective Measures updated at progress report unless specified.    Treatment     Grisel received the treatments listed below:     Supervised modalities after being cleared for contradictions: Paraffin bath - N/A    Direct contact modalities after being cleared for contraindications: Ultrasound - N/A    Manual therapy techniques: Joint mobilizations, Myofacial release, Soft tissue Mobilization, and Friction Massage were applied to the: RUE for 20 minutes, including:  -RUE PROM- wrist flx/ext- 2x10, Tendon glides- 4x5, carpal bone spread- 2x10, Joint mobz/distraction- 2x10,     Therapeutic exercises to develop strength, endurance, ROM, and flexibility for 27 minutes, including:  -RUE AROM- Handgripper- 3x10, intrinsic towel exercise- 3x10, Fisting- 3x10, digit 5 rband- PIP flx/ext- 3x10, DIP flx/ext- 3x10,  isometric pip flx/ext- 2x10, dip flx/ext- 3x10, abd/add- 3x10, Power web flx- 3x10        Patient Education and Home Exercises      Education provided:   -   - Progress towards goals     Written Home Exercises Provided: Patient instructed to cont prior HEP.  Exercises were  reviewed and Grisel was able to demonstrate them prior to the end of the session.  Grisel demonstrated fair  understanding of the HEP provided. See EMR under Patient Instructions for exercises provided during therapy sessions.       Assessment     Pt would continue to benefit from skilled OT.      Grisel is progressing well towards her goals and there are no updates to goals at this time. Pt prognosis is Good.     Pt will continue to benefit from skilled outpatient occupational therapy to address the deficits listed in the problem list on initial evaluation provide pt/family education and to maximize pt's level of independence in the home and community environment.     Pt's spiritual, cultural and educational needs considered and pt agreeable to plan of care and goals.    Anticipated barriers to occupational therapy:     Goals:  Pt. Will increase AROM of RUE as measured by goniometric measurements.  Pt. Will increase Right /pinch strength as measured by dynamometer/pinch gauge.  Pt. Will demonstrate ability to utilize RUE to perform functional tasks (I).  Pt. Will be (I) with HEP.    PLAN       Continue OT POC.      YAA MATTHEWS, OT

## 2022-10-26 ENCOUNTER — CLINICAL SUPPORT (OUTPATIENT)
Dept: REHABILITATION | Facility: HOSPITAL | Age: 37
End: 2022-10-26
Attending: ORTHOPAEDIC SURGERY
Payer: COMMERCIAL

## 2022-10-26 DIAGNOSIS — S66.126D LACERATION OF FLEXOR MUSCLE, FASCIA AND TENDON OF RIGHT LITTLE FINGER AT WRIST AND HAND LEVEL, SUBSEQUENT ENCOUNTER: Primary | ICD-10-CM

## 2022-10-26 PROCEDURE — 97110 THERAPEUTIC EXERCISES: CPT

## 2022-10-26 PROCEDURE — 97140 MANUAL THERAPY 1/> REGIONS: CPT

## 2022-10-26 PROCEDURE — 97018 PARAFFIN BATH THERAPY: CPT

## 2022-10-26 NOTE — PROGRESS NOTES
[facility]  Occupational Therapy Treatment Note    Date: 10/26/2022  Name: Grisel Canales  Clinic Number: [id number]    Therapy Diagnosis: No diagnosis found.  Physician: Robin Jaeger MD    Physician Orders: Evaluate and treat.  Medical Diagnosis: Scar tenolysis of RUE  Surgical Procedure and Date: 9/2/2022,   Evaluation Date: 9/15/2022  Insurance Authorization Period Expiration: 9/13/2022 - 9/13/2023  Plan of Care Expiration: 9/15/2022-11/17/2022    Visit # / Visits authorized: 9/ 20      Precautions:  Standard    Time In: 9:05  Time Out: 9:59  Total Billable Time:54minutes    SUBJECTIVE     Pt reports: no pain today.  She was compliant with home exercise program given last session.   Response to previous treatment:  Functional change:     Pain: 0/10  Location: right fingers , hands , and wrists      OBJECTIVE     Objective Measures updated at progress report unless specified.    Treatment     Grisel received the treatments listed below:     Supervised modalities after being cleared for contradictions: Paraffin bath - 8min    Direct contact modalities after being cleared for contraindications: Ultrasound - N/A    Manual therapy techniques: Joint mobilizations, Myofacial release, Soft tissue Mobilization, and Friction Massage were applied to the: RUE for 20 minutes, including:  -RUE PROM- wrist flx/ext- 2x10, Tendon glides- 4x5, carpal bone spread- 2x10, Joint mobz/distraction- 2x10,     Therapeutic exercises to develop strength, endurance, ROM, and flexibility for 26 minutes, including:  -RUE AROM- Handgripper- 3x10, intrinsic towel exercise- 3x10, Fisting- 3x10, digit 5 rband- PIP flx/ext- 3x10, DIP flx/ext- 3x10,  isometric pip flx/ext- 2x10, dip flx/ext- 3x10, abd/add- 3x10, Power web flx- 3x10, therabar- 3x10, digiflx- 3x10        Patient Education and Home Exercises      Education provided:   -   - Progress towards goals     Written Home Exercises Provided: Patient instructed to  cont prior HEP.  Exercises were reviewed and Grisel was able to demonstrate them prior to the end of the session.  Grisel demonstrated fair  understanding of the HEP provided. See EMR under Patient Instructions for exercises provided during therapy sessions.       Assessment     Pt would continue to benefit from skilled OT.      Grisel is progressing well towards her goals and there are no updates to goals at this time. Pt prognosis is Good.     Pt will continue to benefit from skilled outpatient occupational therapy to address the deficits listed in the problem list on initial evaluation provide pt/family education and to maximize pt's level of independence in the home and community environment.     Pt's spiritual, cultural and educational needs considered and pt agreeable to plan of care and goals.    Anticipated barriers to occupational therapy:     Goals:  Pt. Will increase AROM of RUE as measured by goniometric measurements.  Pt. Will increase Right /pinch strength as measured by dynamometer/pinch gauge.  Pt. Will demonstrate ability to utilize RUE to perform functional tasks (I).  Pt. Will be (I) with HEP.    PLAN       Continue OT POC.      YAA MATTHEWS, OT
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
## 2022-11-08 ENCOUNTER — CLINICAL SUPPORT (OUTPATIENT)
Dept: REHABILITATION | Facility: HOSPITAL | Age: 37
End: 2022-11-08
Attending: ORTHOPAEDIC SURGERY
Payer: COMMERCIAL

## 2022-11-08 DIAGNOSIS — S66.126D LACERATION OF FLEXOR MUSCLE, FASCIA AND TENDON OF RIGHT LITTLE FINGER AT WRIST AND HAND LEVEL, SUBSEQUENT ENCOUNTER: Primary | ICD-10-CM

## 2022-11-08 PROCEDURE — 97140 MANUAL THERAPY 1/> REGIONS: CPT

## 2022-11-08 PROCEDURE — 97110 THERAPEUTIC EXERCISES: CPT

## 2023-01-16 ENCOUNTER — HOSPITAL ENCOUNTER (EMERGENCY)
Facility: HOSPITAL | Age: 38
Discharge: HOME | End: 2023-01-16
Payer: COMMERCIAL

## 2023-01-16 VITALS
RESPIRATION RATE: 17 BRPM | OXYGEN SATURATION: 99 % | WEIGHT: 260 LBS | HEART RATE: 83 BPM | TEMPERATURE: 98 F | DIASTOLIC BLOOD PRESSURE: 100 MMHG | SYSTOLIC BLOOD PRESSURE: 161 MMHG | HEIGHT: 60 IN | BODY MASS INDEX: 51.04 KG/M2

## 2023-01-16 DIAGNOSIS — S39.012A LUMBAR STRAIN, INITIAL ENCOUNTER: Primary | ICD-10-CM
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
## 2023-01-16 DIAGNOSIS — S16.1XXA STRAIN OF NECK MUSCLE, INITIAL ENCOUNTER: ICD-10-CM

## 2023-01-16 PROCEDURE — 99284 EMERGENCY DEPT VISIT MOD MDM: CPT | Mod: ,,, | Performed by: NURSE PRACTITIONER

## 2023-01-16 PROCEDURE — 99284 PR EMERGENCY DEPT VISIT,LEVEL IV: ICD-10-PCS | Mod: ,,, | Performed by: NURSE PRACTITIONER

## 2023-01-16 PROCEDURE — 99284 EMERGENCY DEPT VISIT MOD MDM: CPT

## 2023-01-16 RX ORDER — IBUPROFEN 800 MG/1
800 TABLET ORAL 3 TIMES DAILY
Qty: 15 TABLET | Refills: 0 | Status: SHIPPED | OUTPATIENT
Start: 2023-01-16 | End: 2023-01-21

## 2023-01-16 RX ORDER — METHOCARBAMOL 500 MG/1
1000 TABLET, FILM COATED ORAL 3 TIMES DAILY
Qty: 30 TABLET | Refills: 0 | Status: SHIPPED | OUTPATIENT
Start: 2023-01-16 | End: 2023-01-21
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
## 2023-02-20 ENCOUNTER — HOSPITAL ENCOUNTER (EMERGENCY)
Facility: HOSPITAL | Age: 38
Discharge: HOME | End: 2023-02-20
Payer: COMMERCIAL

## 2023-02-20 VITALS
RESPIRATION RATE: 18 BRPM | OXYGEN SATURATION: 96 % | WEIGHT: 264 LBS | SYSTOLIC BLOOD PRESSURE: 135 MMHG | DIASTOLIC BLOOD PRESSURE: 87 MMHG | HEART RATE: 88 BPM | TEMPERATURE: 99 F | BODY MASS INDEX: 51.83 KG/M2 | HEIGHT: 60 IN

## 2023-02-20 DIAGNOSIS — T14.8XXA MUSCLE STRAIN: Primary | ICD-10-CM
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
## 2023-02-20 PROCEDURE — 99283 EMERGENCY DEPT VISIT LOW MDM: CPT | Mod: ,,, | Performed by: NURSE PRACTITIONER

## 2023-02-20 PROCEDURE — 96372 THER/PROPH/DIAG INJ SC/IM: CPT | Performed by: NURSE PRACTITIONER

## 2023-02-20 PROCEDURE — 99284 EMERGENCY DEPT VISIT MOD MDM: CPT

## 2023-02-20 PROCEDURE — 99283 PR EMERGENCY DEPT VISIT,LEVEL III: ICD-10-PCS | Mod: ,,, | Performed by: NURSE PRACTITIONER

## 2023-02-20 PROCEDURE — 63600175 PHARM REV CODE 636 W HCPCS: Performed by: NURSE PRACTITIONER

## 2023-02-20 RX ORDER — IBUPROFEN 800 MG/1
800 TABLET ORAL 3 TIMES DAILY
Qty: 15 TABLET | Refills: 0 | Status: SHIPPED | OUTPATIENT
Start: 2023-02-20 | End: 2023-02-25

## 2023-02-20 RX ORDER — KETOROLAC TROMETHAMINE 30 MG/ML
60 INJECTION, SOLUTION INTRAMUSCULAR; INTRAVENOUS
Status: COMPLETED | OUTPATIENT
Start: 2023-02-20 | End: 2023-02-20

## 2023-02-20 RX ORDER — METHOCARBAMOL 500 MG/1
1000 TABLET, FILM COATED ORAL 3 TIMES DAILY
Qty: 30 TABLET | Refills: 0 | Status: SHIPPED | OUTPATIENT
Start: 2023-02-20 | End: 2023-02-25

## 2023-02-20 RX ADMIN — KETOROLAC TROMETHAMINE 60 MG: 30 INJECTION, SOLUTION INTRAMUSCULAR at 03:02
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
